# Patient Record
Sex: FEMALE | Race: WHITE | NOT HISPANIC OR LATINO | Employment: OTHER | URBAN - METROPOLITAN AREA
[De-identification: names, ages, dates, MRNs, and addresses within clinical notes are randomized per-mention and may not be internally consistent; named-entity substitution may affect disease eponyms.]

---

## 2024-01-23 PROBLEM — C34.92 PRIMARY ADENOCARCINOMA OF LEFT LUNG (MULTI): Status: ACTIVE | Noted: 2024-01-23

## 2024-01-26 ENCOUNTER — HOSPITAL ENCOUNTER (OUTPATIENT)
Dept: RADIATION ONCOLOGY | Facility: CLINIC | Age: 89
Setting detail: RADIATION/ONCOLOGY SERIES
Discharge: HOME | End: 2024-01-26
Payer: MEDICARE

## 2024-01-26 VITALS
WEIGHT: 152.2 LBS | RESPIRATION RATE: 18 BRPM | DIASTOLIC BLOOD PRESSURE: 78 MMHG | TEMPERATURE: 98.1 F | BODY MASS INDEX: 25.99 KG/M2 | HEIGHT: 64 IN | SYSTOLIC BLOOD PRESSURE: 149 MMHG | HEART RATE: 61 BPM | OXYGEN SATURATION: 93 %

## 2024-01-26 DIAGNOSIS — C34.92 PRIMARY ADENOCARCINOMA OF LEFT LUNG (MULTI): Primary | ICD-10-CM

## 2024-01-26 PROCEDURE — 99215 OFFICE O/P EST HI 40 MIN: CPT | Performed by: STUDENT IN AN ORGANIZED HEALTH CARE EDUCATION/TRAINING PROGRAM

## 2024-01-26 PROCEDURE — 99205 OFFICE O/P NEW HI 60 MIN: CPT | Performed by: STUDENT IN AN ORGANIZED HEALTH CARE EDUCATION/TRAINING PROGRAM

## 2024-01-26 RX ORDER — LOSARTAN POTASSIUM 50 MG/1
100 TABLET ORAL DAILY
COMMUNITY

## 2024-01-26 RX ORDER — GLUCOSAM/CHONDRO/HERB 149/HYAL 750-100 MG
1 TABLET ORAL DAILY
COMMUNITY

## 2024-01-26 RX ORDER — BISOPROLOL FUMARATE AND HYDROCHLOROTHIAZIDE 2.5; 6.25 MG/1; MG/1
1 TABLET ORAL DAILY
COMMUNITY

## 2024-01-26 RX ORDER — CALCIUM CARBONATE/VITAMIN D3 250-3.125
1 TABLET ORAL
COMMUNITY

## 2024-01-26 RX ORDER — PROPRANOLOL HYDROCHLORIDE 20 MG/1
20 TABLET ORAL 3 TIMES DAILY
COMMUNITY

## 2024-01-26 RX ORDER — PANTOPRAZOLE SODIUM 40 MG/1
40 TABLET, DELAYED RELEASE ORAL
COMMUNITY

## 2024-01-26 RX ORDER — HYDROCHLOROTHIAZIDE 25 MG/1
25 TABLET ORAL DAILY
COMMUNITY

## 2024-01-26 ASSESSMENT — COLUMBIA-SUICIDE SEVERITY RATING SCALE - C-SSRS
1. IN THE PAST MONTH, HAVE YOU WISHED YOU WERE DEAD OR WISHED YOU COULD GO TO SLEEP AND NOT WAKE UP?: NO
2. HAVE YOU ACTUALLY HAD ANY THOUGHTS OF KILLING YOURSELF?: NO
6. HAVE YOU EVER DONE ANYTHING, STARTED TO DO ANYTHING, OR PREPARED TO DO ANYTHING TO END YOUR LIFE?: NO

## 2024-01-26 ASSESSMENT — ENCOUNTER SYMPTOMS
DEPRESSION: 0
ENDOCRINE NEGATIVE: 1
GASTROINTESTINAL NEGATIVE: 1
CONSTITUTIONAL NEGATIVE: 1
MUSCULOSKELETAL NEGATIVE: 1
EYES NEGATIVE: 1
PSYCHIATRIC NEGATIVE: 1
NEUROLOGICAL NEGATIVE: 1
SHORTNESS OF BREATH: 1
HEMATOLOGIC/LYMPHATIC NEGATIVE: 1
COUGH: 1
OCCASIONAL FEELINGS OF UNSTEADINESS: 0
CARDIOVASCULAR NEGATIVE: 1
LOSS OF SENSATION IN FEET: 0

## 2024-01-26 ASSESSMENT — PATIENT HEALTH QUESTIONNAIRE - PHQ9
2. FEELING DOWN, DEPRESSED OR HOPELESS: NOT AT ALL
SUM OF ALL RESPONSES TO PHQ9 QUESTIONS 1 AND 2: 0
1. LITTLE INTEREST OR PLEASURE IN DOING THINGS: NOT AT ALL

## 2024-01-26 ASSESSMENT — PAIN SCALES - GENERAL: PAINLEVEL: 0-NO PAIN

## 2024-01-26 NOTE — PROGRESS NOTES
Radiation Oncology Outpatient Consult    Patient Name:  Roro Pulido  MRN:  [id number]  :  12/10/1934    Referring Provider: Clifford Alberts MD  Primary Care Provider: No primary care provider on file.  Care Team: No care team member to display    Date of Service: 2024     SUBJECTIVE  History of Present Illness:  Roro Pulido is a 89 y.o. 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 Clifford Alberts for evaluation and management.  She had a screening CT of the chest in  which incidentally noted a left upper lobe lung nodule.  Follow-up CTs of the chest in 2023 and again in 2023 showed interval increase of the left upper lobe nodule.  Her CT of the chest on 2023 measured the nodule at 1.1 x 1.1 cm.  This was followed up with a PET/CT on 11/15/2023 which showed a hypermetabolic left upper lobe lung nodule, on my review the nodule measured just over 2 cm on the low-dose CT..  There was an additional 9 mm nodular density in the posterior left lower lobe which had a very low SUV of 1.9.  No evidence of regional or distant hypermetabolic disease. Biopsy of the left upper lung showed invasive adenocarcinoma.  She endorses dyspnea on exertion however can complete light household activities without shortness of breath.  She uses albuterol as needed, and does not require other prescription inhalers or home oxygen. She is scheduled for pulmonary function test in late February at [facility]. She denies cough or chest pain.  She is a previous smoker, smoking less than a pack a day over approximately 40 years.    Prior Radiotherapy:  No    Current Systemic Treatment:  No     Presence of Pacemaker or ICD:  No    Past Medical History:    Past Medical History:   Diagnosis Date    GERD (gastroesophageal reflux disease)     HTN (hypertension)     Lung cancer (CMS/HCC)     Vitamin D deficiency         Past Surgical History:  History reviewed. No pertinent  surgical history.     Family History:  Cancer-related family history includes Breast cancer in her sister; Lung cancer in her sister.    Social History:    Social History     Tobacco Use    Smoking status: Former     Packs/day: 0.50     Years: 20.00     Additional pack years: 0.00     Total pack years: 10.00     Types: Cigarettes     Quit date:      Years since quittin.0    Smokeless tobacco: Never   Substance Use Topics    Alcohol use: Yes     Alcohol/week: 2.0 standard drinks of alcohol     Types: 2 Glasses of wine per week    Drug use: Never       Allergies:    Allergies   Allergen Reactions    Aspirin Swelling    Codeine Swelling        Medications:    Current Outpatient Medications:     bisoproloL-hydrochlorothiazide (Ziac) 2.5-6.25 mg tablet, Take 1 tablet by mouth once daily., Disp: , Rfl:     calcium carbonate-vitamin D3 (Oyster Shell) 250 mg-3.125 mcg (125 unit) tablet, Take 1 tablet by mouth 2 times a day with meals., Disp: , Rfl:     hydroCHLOROthiazide (HYDRODiuril) 25 mg tablet, Take 1 tablet (25 mg) by mouth once daily., Disp: , Rfl:     losartan (Cozaar) 50 mg tablet, Take 2 tablets (100 mg) by mouth once daily., Disp: , Rfl:     omega 3-dha-epa-fish oil (Fish OiL) 1,000 mg (120 mg-180 mg) capsule, Take 1 capsule (1,000 mg) by mouth once daily., Disp: , Rfl:     pantoprazole (ProtoNix) 40 mg EC tablet, Take 1 tablet (40 mg) by mouth once daily in the morning. Take before meals. Do not crush, chew, or split., Disp: , Rfl:     propranolol (Inderal) 20 mg tablet, Take 1 tablet (20 mg) by mouth 3 times a day., Disp: , Rfl:       Review of Systems:  Review of Systems   Constitutional: Negative.    HENT:  Negative.     Eyes: Negative.    Respiratory:  Positive for cough and shortness of breath.         Sinus infection     Cardiovascular: Negative.    Gastrointestinal: Negative.    Endocrine: Negative.    Genitourinary: Negative.     Musculoskeletal: Negative.    Skin: Negative.    Neurological:  "Negative.    Hematological: Negative.    Psychiatric/Behavioral: Negative.       The patient's current pain level was assessed.  They report currently having a pain of 0 out of 10.  They feel their pain is under control without the use of pain medications.    Performance Status:  The Karnofsky performance scale today is 80, Normal activity with effort; some signs or symptoms of disease (ECOG equivalent 1).        OBJECTIVE  Physical Exam:  /78   Pulse 61   Temp 36.7 °C (98.1 °F)   Resp 18   Ht 1.626 m (5' 4\")   Wt 69 kg (152 lb 3.2 oz)   SpO2 93%   BMI 26.13 kg/m²    Physical Exam  Vitals reviewed.   Constitutional:       General: She is not in acute distress.  HENT:      Head: Normocephalic and atraumatic.   Cardiovascular:      Rate and Rhythm: Normal rate and regular rhythm.      Heart sounds: No murmur heard.  Pulmonary:      Effort: Pulmonary effort is normal. No respiratory distress.      Breath sounds: No wheezing.   Skin:     Findings: No erythema or rash.   Neurological:      Mental Status: She is alert and oriented to person, place, and time.   Psychiatric:         Mood and Affect: Mood normal.         Behavior: Behavior normal.        Pathology Review:  The pertinent pathology results were reviewed and discussed with the patient.     Imaging:  The pertinent imaging results were reviewed and discussed with the patient.         ASSESSMENT:  Roro Pulido is a 89 y.o. female with Primary adenocarcinoma of left lung (CMS/HCC), Clinical: Stage IA3 (cT1c, cN0, cM0).    PLAN:    We reviewed the management options for early stage lung malignancy including surgery versus stereotactic radiotherapy. She has a small peripheral primary malignancy which is amenable to SBRT in 3 fractions.   I believe this is a much more feasible option for her than surgery considering her age. We discussed the logistics of radiation planning including CT simulation.  Acute side effects of treatment include but are not " limited to fatigue, focal skin reaction, cough, shortness of breath, pneumonitis, chest wall pain.  Long-term risks of treatment include but are not limited to fibrosis of the lung, chest wall toxicity, damage to other organs of the thorax including the heart, esophagus, spinal cord, rare risk of secondary malignancy.   She stated her understanding and wishes to proceed.  Informed consent was signed.  CT simulation will be scheduled.    NCCN Guidelines were applicable to guide this patients treatment plan.     Thank you for allowing me to participate in this patient's care.    Ryan Newton MD  Department of Radiation Oncology  [facility]    Portions of this note were generated using voice recognition software, and may be subject to transcription errors.

## 2024-01-31 ENCOUNTER — HOSPITAL ENCOUNTER (OUTPATIENT)
Dept: RADIATION ONCOLOGY | Facility: HOSPITAL | Age: 89
Setting detail: RADIATION/ONCOLOGY SERIES
Discharge: HOME | End: 2024-01-31
Payer: MEDICARE

## 2024-01-31 ENCOUNTER — HOSPITAL ENCOUNTER (OUTPATIENT)
Dept: RADIOLOGY | Facility: EXTERNAL LOCATION | Age: 89
Discharge: HOME | End: 2024-01-31

## 2024-01-31 DIAGNOSIS — C34.92 PRIMARY ADENOCARCINOMA OF LEFT LUNG (MULTI): Primary | ICD-10-CM

## 2024-01-31 DIAGNOSIS — C34.92 PRIMARY ADENOCARCINOMA OF LEFT LUNG (MULTI): ICD-10-CM

## 2024-01-31 PROCEDURE — 77334 RADIATION TREATMENT AID(S): CPT | Performed by: RADIOLOGY

## 2024-01-31 PROCEDURE — 77290 THER RAD SIMULAJ FIELD CPLX: CPT | Performed by: RADIOLOGY

## 2024-02-01 ENCOUNTER — APPOINTMENT (OUTPATIENT)
Dept: RADIATION ONCOLOGY | Facility: HOSPITAL | Age: 89
End: 2024-02-01
Payer: MEDICARE

## 2024-02-01 PROCEDURE — 77263 THER RADIOLOGY TX PLNG CPLX: CPT | Performed by: STUDENT IN AN ORGANIZED HEALTH CARE EDUCATION/TRAINING PROGRAM

## 2024-02-06 ENCOUNTER — HOSPITAL ENCOUNTER (OUTPATIENT)
Dept: RADIATION ONCOLOGY | Facility: CLINIC | Age: 89
Setting detail: RADIATION/ONCOLOGY SERIES
Discharge: HOME | End: 2024-02-06
Payer: MEDICARE

## 2024-02-07 PROCEDURE — 77370 RADIATION PHYSICS CONSULT: CPT | Performed by: STUDENT IN AN ORGANIZED HEALTH CARE EDUCATION/TRAINING PROGRAM

## 2024-02-08 ENCOUNTER — HOSPITAL ENCOUNTER (OUTPATIENT)
Dept: RADIATION ONCOLOGY | Facility: CLINIC | Age: 89
Setting detail: RADIATION/ONCOLOGY SERIES
Discharge: HOME | End: 2024-02-08
Payer: MEDICARE

## 2024-02-08 ENCOUNTER — APPOINTMENT (OUTPATIENT)
Dept: RADIATION ONCOLOGY | Facility: HOSPITAL | Age: 89
End: 2024-02-08
Payer: MEDICARE

## 2024-02-08 PROCEDURE — 77293 RESPIRATOR MOTION MGMT SIMUL: CPT | Performed by: STUDENT IN AN ORGANIZED HEALTH CARE EDUCATION/TRAINING PROGRAM

## 2024-02-08 PROCEDURE — 77295 3-D RADIOTHERAPY PLAN: CPT | Performed by: STUDENT IN AN ORGANIZED HEALTH CARE EDUCATION/TRAINING PROGRAM

## 2024-02-08 PROCEDURE — 77334 RADIATION TREATMENT AID(S): CPT | Performed by: STUDENT IN AN ORGANIZED HEALTH CARE EDUCATION/TRAINING PROGRAM

## 2024-02-08 PROCEDURE — 77300 RADIATION THERAPY DOSE PLAN: CPT | Performed by: STUDENT IN AN ORGANIZED HEALTH CARE EDUCATION/TRAINING PROGRAM

## 2024-02-14 ENCOUNTER — HOSPITAL ENCOUNTER (OUTPATIENT)
Dept: RADIATION ONCOLOGY | Facility: CLINIC | Age: 89
Setting detail: RADIATION/ONCOLOGY SERIES
Discharge: HOME | End: 2024-02-14
Payer: MEDICARE

## 2024-02-14 DIAGNOSIS — C34.12 MALIGNANT NEOPLASM OF UPPER LOBE, LEFT BRONCHUS OR LUNG (MULTI): ICD-10-CM

## 2024-02-14 LAB
RAD ONC MSQ ACTUAL FRACTIONS DELIVERED: 1
RAD ONC MSQ ACTUAL SESSION DELIVERED DOSE: 1800 CGRAY
RAD ONC MSQ ACTUAL TOTAL DOSE: 1800 CGRAY
RAD ONC MSQ ELAPSED DAYS: 0
RAD ONC MSQ LAST DATE: NORMAL
RAD ONC MSQ PRESCRIBED FRACTIONAL DOSE: 1800 CGRAY
RAD ONC MSQ PRESCRIBED NUMBER OF FRACTIONS: 3
RAD ONC MSQ PRESCRIBED TECHNIQUE: NORMAL
RAD ONC MSQ PRESCRIBED TOTAL DOSE: 5400 CGRAY
RAD ONC MSQ PRESCRIPTION PATTERN COMMENT: NORMAL
RAD ONC MSQ START DATE: NORMAL
RAD ONC MSQ TREATMENT COURSE NUMBER: 1
RAD ONC MSQ TREATMENT SITE: NORMAL

## 2024-02-14 PROCEDURE — 77280 THER RAD SIMULAJ FIELD SMPL: CPT | Performed by: STUDENT IN AN ORGANIZED HEALTH CARE EDUCATION/TRAINING PROGRAM

## 2024-02-14 PROCEDURE — 77373 STRTCTC BDY RAD THER TX DLVR: CPT | Performed by: STUDENT IN AN ORGANIZED HEALTH CARE EDUCATION/TRAINING PROGRAM

## 2024-02-16 ENCOUNTER — HOSPITAL ENCOUNTER (OUTPATIENT)
Dept: RADIATION ONCOLOGY | Facility: CLINIC | Age: 89
Setting detail: RADIATION/ONCOLOGY SERIES
Discharge: HOME | End: 2024-02-16
Payer: MEDICARE

## 2024-02-16 DIAGNOSIS — C34.12 MALIGNANT NEOPLASM OF UPPER LOBE, LEFT BRONCHUS OR LUNG (MULTI): ICD-10-CM

## 2024-02-16 LAB
RAD ONC MSQ ACTUAL FRACTIONS DELIVERED: 2
RAD ONC MSQ ACTUAL SESSION DELIVERED DOSE: 1800 CGRAY
RAD ONC MSQ ACTUAL TOTAL DOSE: 3600 CGRAY
RAD ONC MSQ ELAPSED DAYS: 2
RAD ONC MSQ LAST DATE: NORMAL
RAD ONC MSQ PRESCRIBED FRACTIONAL DOSE: 1800 CGRAY
RAD ONC MSQ PRESCRIBED NUMBER OF FRACTIONS: 3
RAD ONC MSQ PRESCRIBED TECHNIQUE: NORMAL
RAD ONC MSQ PRESCRIBED TOTAL DOSE: 5400 CGRAY
RAD ONC MSQ PRESCRIPTION PATTERN COMMENT: NORMAL
RAD ONC MSQ START DATE: NORMAL
RAD ONC MSQ TREATMENT COURSE NUMBER: 1
RAD ONC MSQ TREATMENT SITE: NORMAL

## 2024-02-16 PROCEDURE — 77373 STRTCTC BDY RAD THER TX DLVR: CPT | Performed by: STUDENT IN AN ORGANIZED HEALTH CARE EDUCATION/TRAINING PROGRAM

## 2024-02-19 ENCOUNTER — RADIATION ONCOLOGY OTV (OUTPATIENT)
Dept: RADIATION ONCOLOGY | Facility: CLINIC | Age: 89
End: 2024-02-19
Payer: MEDICARE

## 2024-02-19 ENCOUNTER — HOSPITAL ENCOUNTER (OUTPATIENT)
Dept: RADIATION ONCOLOGY | Facility: CLINIC | Age: 89
Setting detail: RADIATION/ONCOLOGY SERIES
Discharge: HOME | End: 2024-02-19
Payer: MEDICARE

## 2024-02-19 ENCOUNTER — DOCUMENTATION (OUTPATIENT)
Dept: RADIATION ONCOLOGY | Facility: CLINIC | Age: 89
End: 2024-02-19
Payer: MEDICARE

## 2024-02-19 VITALS
SYSTOLIC BLOOD PRESSURE: 194 MMHG | TEMPERATURE: 97.9 F | HEART RATE: 63 BPM | WEIGHT: 151.4 LBS | OXYGEN SATURATION: 94 % | BODY MASS INDEX: 25.99 KG/M2 | DIASTOLIC BLOOD PRESSURE: 81 MMHG | RESPIRATION RATE: 18 BRPM

## 2024-02-19 DIAGNOSIS — C34.12 MALIGNANT NEOPLASM OF UPPER LOBE, LEFT BRONCHUS OR LUNG (MULTI): ICD-10-CM

## 2024-02-19 DIAGNOSIS — C34.92 PRIMARY ADENOCARCINOMA OF LEFT LUNG (MULTI): Primary | ICD-10-CM

## 2024-02-19 LAB
RAD ONC MSQ ACTUAL FRACTIONS DELIVERED: 3
RAD ONC MSQ ACTUAL SESSION DELIVERED DOSE: 1800 CGRAY
RAD ONC MSQ ACTUAL TOTAL DOSE: 5400 CGRAY
RAD ONC MSQ ELAPSED DAYS: 5
RAD ONC MSQ LAST DATE: NORMAL
RAD ONC MSQ PRESCRIBED FRACTIONAL DOSE: 1800 CGRAY
RAD ONC MSQ PRESCRIBED NUMBER OF FRACTIONS: 3
RAD ONC MSQ PRESCRIBED TECHNIQUE: NORMAL
RAD ONC MSQ PRESCRIBED TOTAL DOSE: 5400 CGRAY
RAD ONC MSQ PRESCRIPTION PATTERN COMMENT: NORMAL
RAD ONC MSQ START DATE: NORMAL
RAD ONC MSQ TREATMENT COURSE NUMBER: 1
RAD ONC MSQ TREATMENT SITE: NORMAL

## 2024-02-19 PROCEDURE — 77373 STRTCTC BDY RAD THER TX DLVR: CPT | Performed by: STUDENT IN AN ORGANIZED HEALTH CARE EDUCATION/TRAINING PROGRAM

## 2024-02-19 PROCEDURE — 77435 SBRT MANAGEMENT: CPT | Performed by: STUDENT IN AN ORGANIZED HEALTH CARE EDUCATION/TRAINING PROGRAM

## 2024-02-19 PROCEDURE — 77336 RADIATION PHYSICS CONSULT: CPT | Performed by: STUDENT IN AN ORGANIZED HEALTH CARE EDUCATION/TRAINING PROGRAM

## 2024-02-19 ASSESSMENT — PAIN SCALES - GENERAL: PAINLEVEL: 0-NO PAIN

## 2024-02-19 NOTE — PROGRESS NOTES
Radiation Oncology On Treatment Visit    Patient Name:  Roro Pulido  MRN:  [id number]  :  12/10/1934    Referring Provider: No ref. provider found  Primary Care Provider: No primary care provider on file.  Care Team: No care team member to display    Date of Service: 2024     Diagnosis:   Specialty Problems          Radiation Oncology Problems    Primary adenocarcinoma of left lung (CMS/HCC)         Treatment Summary:  Radiation Treatments       Active   No active radiation treatments to show.     Completed   BUCK lung (Started on 2024)   Most recent fraction: 1,800 cGy given on 2024   Total given: 5,400 cGy / 5,400 cGy  (3 of 3 fractions)   Elapsed Days: 5   Technique: SBRT                   SUBJECTIVE: Tolerated treatment course well without complaints.  She has mild dyspnea on exertion at baseline.  Denies  worsening shortness of breath or cough.      OBJECTIVE:   Vital Signs:  BP (!) 194/81   Pulse 63   Temp 36.6 °C (97.9 °F)   Resp 18   Wt 68.7 kg (151 lb 6.4 oz)   SpO2 94%   BMI 25.99 kg/m²    Pain Scale: The patient's current pain level was assessed.  They report currently having a pain of 0 out of 10.    Other Pertinent Findings:     Toxicity Assessment          2024    08:52   Toxicity Assessment   Treatment Site Thoracic   Cough Grade 1       mild occ cough that is dry   Dyspnea Grade 1       SOB with exertion        Assessment / Plan:  The patient is tolerating radiation therapy as anticipated.  End of tx today. Follow-up CT chest w/o in 3 months, follow-up appt at that time.        Thank you for allowing me to participate in this patient's care.    Ryan Newton MD  Department of Radiation Oncology  [facility]    Portions of this note were generated using voice recognition software, and may be subject to transcription errors.

## 2024-02-19 NOTE — PROGRESS NOTES
Radiation Oncology Treatment Summary    Patient Name:  Roro Pulido  MRN:  [id number]  :  12/10/1934    Referring Provider: Clifford Alberts MD  Primary Care Provider: No primary care provider on file.    Brief History: oRro Pulido is a 89 y.o. female with Primary adenocarcinoma of left lung (CMS/HCC), Clinical: Stage IA3 (cT1c, cN0, cM0).  The patient completed radiotherapy as outlined below.    Radiation Treatment Summary:    Radiation Treatments       Active   No active radiation treatments to show.     Completed   BUCK lung (Started on 2024)   Most recent fraction: 1,800 cGy given on 2024   Total given: 5,400 cGy / 5,400 cGy  (3 of 3 fractions)   Elapsed Days: 5   Technique: SBRT                     Please see the patient's [facility] chart for further details regarding the radiation plan, including beam energy.    Concurrent Chemotherapy: none    CTCAE Toxicity Overview:   Toxicity Assessment          2024    08:52   Toxicity Assessment   Treatment Site Thoracic   Cough Grade 1       mild occ cough that is dry   Dyspnea Grade 1       SOB with exertion     Patient Disposition:   The patient will follow-up in 3 months with a surveillance CT chest without contrast.    Thank you for allowing me to participate in this patient's care.    Ryan Newton MD  Department of Radiation Oncology  [facility]    Portions of this note were generated using voice recognition software, and may be subject to transcription errors.

## 2024-05-20 ENCOUNTER — HOSPITAL ENCOUNTER (OUTPATIENT)
Dept: RADIOLOGY | Facility: CLINIC | Age: 89
Discharge: HOME | End: 2024-05-20
Payer: MEDICARE

## 2024-05-20 DIAGNOSIS — C34.92 PRIMARY ADENOCARCINOMA OF LEFT LUNG (MULTI): ICD-10-CM

## 2024-05-20 PROCEDURE — 71250 CT THORAX DX C-: CPT | Performed by: RADIOLOGY

## 2024-05-20 PROCEDURE — 71250 CT THORAX DX C-: CPT

## 2024-05-23 ENCOUNTER — HOSPITAL ENCOUNTER (OUTPATIENT)
Dept: RADIATION ONCOLOGY | Facility: CLINIC | Age: 89
Setting detail: RADIATION/ONCOLOGY SERIES
Discharge: HOME | End: 2024-05-23
Payer: MEDICARE

## 2024-05-23 VITALS
SYSTOLIC BLOOD PRESSURE: 144 MMHG | TEMPERATURE: 98.1 F | OXYGEN SATURATION: 94 % | BODY MASS INDEX: 26.23 KG/M2 | DIASTOLIC BLOOD PRESSURE: 67 MMHG | RESPIRATION RATE: 18 BRPM | HEART RATE: 72 BPM | WEIGHT: 152.8 LBS

## 2024-05-23 DIAGNOSIS — R91.1 PULMONARY NODULE: ICD-10-CM

## 2024-05-23 DIAGNOSIS — C34.92 PRIMARY ADENOCARCINOMA OF LEFT LUNG (MULTI): Primary | ICD-10-CM

## 2024-05-23 PROCEDURE — 99214 OFFICE O/P EST MOD 30 MIN: CPT | Performed by: STUDENT IN AN ORGANIZED HEALTH CARE EDUCATION/TRAINING PROGRAM

## 2024-05-23 ASSESSMENT — PAIN SCALES - GENERAL: PAINLEVEL: 0-NO PAIN

## 2024-05-23 ASSESSMENT — ENCOUNTER SYMPTOMS
GASTROINTESTINAL NEGATIVE: 1
EYES NEGATIVE: 1
CARDIOVASCULAR NEGATIVE: 1
ARTHRALGIAS: 1
NEUROLOGICAL NEGATIVE: 1
PSYCHIATRIC NEGATIVE: 1
CONSTITUTIONAL NEGATIVE: 1
SHORTNESS OF BREATH: 1
HEMATOLOGIC/LYMPHATIC NEGATIVE: 1
ENDOCRINE NEGATIVE: 1

## 2024-05-23 NOTE — PROGRESS NOTES
Radiation Oncology Follow-Up    Patient Name:  Roro Pulido  MRN:  [id number]  :  12/10/1934    Referring Provider: No ref. provider found  Primary Care Provider: ZAFAR Peace  Care Team: Patient Care Team:  ZAFAR Peace as PCP - General (Family Medicine)    Date of Service: 2024     SUBJECTIVE  History of Present Illness:   Roro Pulido is a 89 y.o. female with adenocarcinoma of the left upper lobe lung, [id number] Stage 1A3. S/p SBRT 54 Gy in 3 fx completed 2024. Rturning today for follow-up. CT chest without contrast on  24 showed interval decrease in size of treated left upper lobe lung nodule with surrounding opacities and trace pleural effusion or thickening reflecting posttreatment changes versus nonspecific inflammation/infection.  There is a rounded left lower lobe pulmonary nodule measuring 13 mm, which appears slightly increased in size from 9mm previously.  This nodule was present prior to her left upper lobe SBRT course, and did not show activity on prior PET/CT.  She states she is overall well with no new or worsening shortness of breath since her radiotherapy course.  She recently saw her pulmonology team.  She denies new or worsening cough or chest pain.  She endorses good appetite.    Treatment Rendered:   SBRT: Left Upper lobe of lung    Treatment Period Technique Fraction Dose Fractions Total Dose   Course 1 2024-2024  (days elapsed: 5)         BUCK lung 2024-2024 SBRT 1800 / 1,800 cGy 3 / 3 5400 / 5,400 cGy       Review of Systems:   Review of Systems - Oncology  - please see nursing note    Performance Status:   The Karnofsky performance scale today is 80, Normal activity with effort; some signs or symptoms of disease (ECOG equivalent 1).      OBJECTIVE  /67   Pulse 72   Temp 36.7 °C (98.1 °F)   Resp 18   Wt 69.3 kg (152 lb 12.8 oz)   SpO2 94%   BMI 26.23 kg/m²    Physical Exam  Vitals reviewed.   Constitutional:        General: She is not in acute distress.  HENT:      Head: Normocephalic and atraumatic.   Cardiovascular:      Rate and Rhythm: Normal rate and regular rhythm.      Heart sounds: No murmur heard.  Pulmonary:      Effort: Pulmonary effort is normal. No respiratory distress.      Breath sounds: No wheezing.   Abdominal:      General: There is no distension.   Skin:     Findings: No erythema or rash.   Neurological:      Mental Status: She is alert and oriented to person, place, and time.   Psychiatric:         Mood and Affect: Mood normal.         Behavior: Behavior normal.           ASSESSMENT:  Roro Pulido is a 89 y.o. female with adenocarcinoma of the left upper lobe lung, [id number] Stage 1A3. S/p SBRT 54 Gy in 3 fx completed 2/2024.  Posttreatment CT showing local control of treated left upper lobe malignancy with evidence of surrounding inflammation/scarring.  She is asymptomatic from this.  There has been slight interval growth of a rounded left lower lobe pulmonary nodule, which did not show PET activity on on her prior PET from 2023.     PLAN:   Will obtain PET/CT now to evaluate enlarging left lower lobe nodule, and to assess for any potential new areas of activity.  If the nodule shows increased activity, I would recommend a biopsy of the nodule.  I will call the patient to discuss PET results once they are available.    NCCN Guidelines were applicable to guide this patients treatment plan.    Thank you for allowing me to participate in this patient's care.    Ryan Newton MD  Department of Radiation Oncology  [facility]    Portions of this note were generated using voice recognition software, and may be subject to transcription errors.

## 2024-05-23 NOTE — PROGRESS NOTES
Radiation Oncology Nursing Note    Pain: The patient's current pain level was assessed.  They report currently having a pain of 0 out of 10.  They feel their pain is under control without the use of pain medications.    Review of Systems:  Review of Systems   Constitutional: Negative.    HENT:  Negative.     Eyes: Negative.    Respiratory:  Positive for shortness of breath.    Cardiovascular: Negative.    Gastrointestinal: Negative.    Endocrine: Negative.    Genitourinary: Negative.     Musculoskeletal:  Positive for arthralgias.   Skin: Negative.    Neurological: Negative.    Hematological: Negative.    Psychiatric/Behavioral: Negative.

## 2024-06-13 ENCOUNTER — HOSPITAL ENCOUNTER (OUTPATIENT)
Dept: RADIOLOGY | Facility: CLINIC | Age: 89
Discharge: HOME | End: 2024-06-13
Payer: MEDICARE

## 2024-06-13 DIAGNOSIS — C34.92 PRIMARY ADENOCARCINOMA OF LEFT LUNG (MULTI): ICD-10-CM

## 2024-06-13 PROCEDURE — 78815 PET IMAGE W/CT SKULL-THIGH: CPT | Mod: PS

## 2024-06-13 PROCEDURE — 3430000001 HC RX 343 DIAGNOSTIC RADIOPHARMACEUTICALS: Performed by: STUDENT IN AN ORGANIZED HEALTH CARE EDUCATION/TRAINING PROGRAM

## 2024-06-13 PROCEDURE — A9552 F18 FDG: HCPCS | Performed by: STUDENT IN AN ORGANIZED HEALTH CARE EDUCATION/TRAINING PROGRAM

## 2024-06-13 RX ORDER — FLUDEOXYGLUCOSE F 18 200 MCI/ML
12.1 INJECTION, SOLUTION INTRAVENOUS
Status: COMPLETED | OUTPATIENT
Start: 2024-06-13 | End: 2024-06-13

## 2024-06-19 ENCOUNTER — HOSPITAL ENCOUNTER (OUTPATIENT)
Dept: RADIATION ONCOLOGY | Facility: CLINIC | Age: 89
Setting detail: RADIATION/ONCOLOGY SERIES
Discharge: HOME | End: 2024-06-19
Payer: MEDICARE

## 2024-06-19 DIAGNOSIS — N85.8 ABNORMAL COLLECTION OF FLUID IN UTERINE CAVITY: ICD-10-CM

## 2024-06-19 DIAGNOSIS — C34.92 PRIMARY ADENOCARCINOMA OF LEFT LUNG (MULTI): Primary | ICD-10-CM

## 2024-06-19 PROCEDURE — 99442 PR PHYS/QHP TELEPHONE EVALUATION 11-20 MIN: CPT | Performed by: STUDENT IN AN ORGANIZED HEALTH CARE EDUCATION/TRAINING PROGRAM

## 2024-06-19 ASSESSMENT — ENCOUNTER SYMPTOMS
GASTROINTESTINAL NEGATIVE: 1
NEUROLOGICAL NEGATIVE: 1
ENDOCRINE NEGATIVE: 1
CARDIOVASCULAR NEGATIVE: 1
CONSTITUTIONAL NEGATIVE: 1
COUGH: 1
HEMATOLOGIC/LYMPHATIC NEGATIVE: 1
SHORTNESS OF BREATH: 1
MUSCULOSKELETAL NEGATIVE: 1
EYES NEGATIVE: 1
PSYCHIATRIC NEGATIVE: 1

## 2024-06-19 ASSESSMENT — PAIN SCALES - GENERAL: PAINLEVEL: 0-NO PAIN

## 2024-06-19 NOTE — PROGRESS NOTES
Radiation Oncology Nursing Note    Pain: The patient's current pain level was assessed.  They report currently having a pain of 0 out of 10.  They feel their pain is under control without the use of pain medications.    Review of Systems:  Review of Systems   Constitutional: Negative.    HENT:  Negative.     Eyes: Negative.    Respiratory:  Positive for cough and shortness of breath.    Cardiovascular: Negative.    Gastrointestinal: Negative.    Endocrine: Negative.    Genitourinary: Negative.     Musculoskeletal: Negative.    Skin: Negative.    Neurological: Negative.    Hematological: Negative.    Psychiatric/Behavioral: Negative.

## 2024-06-19 NOTE — PROGRESS NOTES
Radiation Oncology Follow-Up    Patient Name:  Roro Pulido  MRN:  [id number]  :  12/10/1934    Referring Provider: No ref. provider found  Primary Care Provider: ZAFAR Peace  Care Team: Patient Care Team:  ZAFAR Peace as PCP - General (Family Medicine)    Date of Service: 2024     SUBJECTIVE  History of Present Illness:   Roro Pulido is a 89 y.o. female with adenocarcinoma of the left upper lobe lung, [id number] Stage 1A3. S/p SBRT 54 Gy in 3 fx completed 2024.    Subsequent CT of the chest showed interval enlargement of a left lower lobe pulmonary nodule.  She was recommended PET/CT for further evaluation.  PET/CT on 2024 showed interval worsening of consolidative opacities in the in the left upper lobe with a bandlike opacity of the lingula with an SUV max of 22, consolidative opacity in the superior left upper lobe with SUV max of 6.4, and a 1.3 cm left lower lobe nodule showing SUV max of 4.0.  There was a mildly hypermetabolic left prevascular node with SUV max of 3.4.  There was also mild activity noted within a fluid-filled uterus, ultrasound was recommended for further evaluation.   She endorses continued slight cough and shortness of breath which has been stable in recent months.  She denies pelvic pain, uterine discharge, or uterine bleeding.  She does not have a history of uterine fibroids or cancer.         Treatment Rendered:   SBRT: Left Upper lobe of lung    Treatment Period Technique Fraction Dose Fractions Total Dose   Course 1 2024-2024  (days elapsed: 5)         BUCK lung 2024-2024 SBRT 1800 / 1,800 cGy 3 / 3 5400 / 5,400 cGy       Review of Systems:   Review of Systems - Oncology  - please see nursing note    Performance Status:   The Karnofsky performance scale today is 80, Normal activity with effort; some signs or symptoms of disease (ECOG equivalent 1).      OBJECTIVE  Physical Exam - NA phone visit     ASSESSMENT:  Roro  Ashok is a 89 y.o. female with  adenocarcinoma of the left upper lobe lung, [id number] Stage 1A3. S/p SBRT 54 Gy in 3 fx completed 2/2024.   Subsequent CT of the chest was concerning for interval enlargement of the left lower lobe pulmonary nodule, prompting PET/CT.  PET/CT showed left upper lobe opacities around the treated left upper lobe malignancy, which on my review is most consistent with inflammation and fibrosis.  There is however a new bandlike opacity in the lingula with significant avidity, potentially concerning for malignancy.  There is also mild uptake in an enlarging left lower lobe pulmonary nodule which is concerning.  There is also mention of a mildly avid prevascular lymph node, and mild avidity within the uterus with a fluid-filled endometrium.    PLAN:   Concern for progressive malignancy in the left lingula and left lower lobe of the lung, and possibly mediastinum.  Will review findings  at lung tumor conference to discuss workup and treatment options.  I also discussed the uterine finding on PET, and a transvaginal ultrasound was ordered to further evaluate.  I will call the patient with results after tumor board is complete.    Thank you for allowing me to participate in this patient's care.    Ryan Newton MD  Department of Radiation Oncology  [facility]    Portions of this note were generated using voice recognition software, and may be subject to transcription errors.      Verbal consent for encounter: Verbal consent was requested and obtained from the patient on this date for a telehealth visit.

## 2024-06-20 ENCOUNTER — HOSPITAL ENCOUNTER (OUTPATIENT)
Dept: RADIOLOGY | Facility: CLINIC | Age: 89
Discharge: HOME | End: 2024-06-20
Payer: MEDICARE

## 2024-06-20 DIAGNOSIS — N85.8 ABNORMAL COLLECTION OF FLUID IN UTERINE CAVITY: ICD-10-CM

## 2024-06-20 PROCEDURE — 76830 TRANSVAGINAL US NON-OB: CPT

## 2024-06-25 ENCOUNTER — TELEPHONE (OUTPATIENT)
Dept: RADIATION ONCOLOGY | Facility: CLINIC | Age: 89
End: 2024-06-25
Payer: MEDICARE

## 2024-06-25 DIAGNOSIS — R93.89 THICKENED ENDOMETRIUM: Primary | ICD-10-CM

## 2024-06-25 NOTE — TELEPHONE ENCOUNTER
Called to update patient re: TV/US results  showing endometrial thickening.  Will refer the patient to GYN oncology for further eval. I am still planning to discuss her lung imaging at the next [facility] lung tumor conference.     Thank you for allowing me to participate in this patient's care.    Ryan Newton MD  Department of Radiation Oncology  [facility]    Portions of this note were generated using voice recognition software, and may be subject to transcription errors.

## 2024-07-10 ENCOUNTER — APPOINTMENT (OUTPATIENT)
Dept: HEMATOLOGY/ONCOLOGY | Facility: HOSPITAL | Age: 89
End: 2024-07-10
Payer: MEDICARE

## 2024-07-10 ENCOUNTER — TELEPHONE (OUTPATIENT)
Dept: RADIATION ONCOLOGY | Facility: CLINIC | Age: 89
End: 2024-07-10

## 2024-07-10 DIAGNOSIS — C34.92 PRIMARY ADENOCARCINOMA OF LEFT LUNG (MULTI): Primary | ICD-10-CM

## 2024-07-10 DIAGNOSIS — J18.9 PNEUMONIA OF LEFT UPPER LOBE DUE TO INFECTIOUS ORGANISM: ICD-10-CM

## 2024-07-10 RX ORDER — LEVOFLOXACIN 750 MG/1
750 TABLET ORAL DAILY
Qty: 5 TABLET | Refills: 0 | Status: SHIPPED | OUTPATIENT
Start: 2024-07-10 | End: 2024-07-15

## 2024-07-10 NOTE — TELEPHONE ENCOUNTER
Reviewed patient at [facility] . No clear mass in the left lingula causing post-obstruction - findings more consistent with a pneumonia vs pneumonitis picture. LLL lung nodule minimally changed since January 2024. Options for management include a bronchoscopy / biopsy of the lingula to rule out obstruction from malignancy, vs continued close surveillance and empirix tx of pneumonia. I discussed options with the patient and our plan is to monitor the lungs closely with a repeat CT chest in 3 months. I will empircally send a 5-day course of abx to her pharmacy. She plans to establish with gynecology for the finding of thickened endometrium.    Thank you for allowing me to participate in this patient's care.    Ryan Newton MD  Department of Radiation Oncology  [facility]    Portions of this note were generated using voice recognition software, and may be subject to transcription errors.

## 2024-07-17 ENCOUNTER — TELEPHONE (OUTPATIENT)
Dept: RADIATION ONCOLOGY | Facility: CLINIC | Age: 89
End: 2024-07-17
Payer: MEDICARE

## 2024-07-17 NOTE — TELEPHONE ENCOUNTER
This patient started taking the Levaquin on Monday evening and proceeded to have sore legs which got hard. This had never happened to her previously and she had not done anything outside of normal that evening. She stopped taking the antibiotic after one dose and the reaction has been improving. She called asking what she should do in terms of either continuing her antibiotic or starting a new one.  She was advised to stop the antibiotic at this time and we will observe as she does not have symptoms concerning for a developing pneumonia at this time.

## 2024-08-09 ENCOUNTER — APPOINTMENT (OUTPATIENT)
Dept: PRIMARY CARE | Facility: CLINIC | Age: 89
End: 2024-08-09
Payer: MEDICARE

## 2024-08-09 ENCOUNTER — TELEPHONE (OUTPATIENT)
Dept: PRIMARY CARE | Facility: CLINIC | Age: 89
End: 2024-08-09

## 2024-08-09 VITALS
BODY MASS INDEX: 26.9 KG/M2 | HEART RATE: 62 BPM | SYSTOLIC BLOOD PRESSURE: 129 MMHG | WEIGHT: 151.8 LBS | HEIGHT: 63 IN | DIASTOLIC BLOOD PRESSURE: 65 MMHG | OXYGEN SATURATION: 95 %

## 2024-08-09 DIAGNOSIS — G25.0 ESSENTIAL TREMOR: ICD-10-CM

## 2024-08-09 DIAGNOSIS — C34.92 PRIMARY ADENOCARCINOMA OF LEFT LUNG (MULTI): ICD-10-CM

## 2024-08-09 DIAGNOSIS — I10 BENIGN ESSENTIAL HTN: ICD-10-CM

## 2024-08-09 DIAGNOSIS — K21.00 GASTROESOPHAGEAL REFLUX DISEASE WITH ESOPHAGITIS WITHOUT HEMORRHAGE: ICD-10-CM

## 2024-08-09 DIAGNOSIS — R13.19 ESOPHAGEAL DYSPHAGIA: ICD-10-CM

## 2024-08-09 DIAGNOSIS — E21.3 HYPERPARATHYROIDISM (MULTI): Primary | ICD-10-CM

## 2024-08-09 PROCEDURE — 1126F AMNT PAIN NOTED NONE PRSNT: CPT | Performed by: INTERNAL MEDICINE

## 2024-08-09 PROCEDURE — 99205 OFFICE O/P NEW HI 60 MIN: CPT | Performed by: INTERNAL MEDICINE

## 2024-08-09 PROCEDURE — 3074F SYST BP LT 130 MM HG: CPT | Performed by: INTERNAL MEDICINE

## 2024-08-09 PROCEDURE — 3078F DIAST BP <80 MM HG: CPT | Performed by: INTERNAL MEDICINE

## 2024-08-09 ASSESSMENT — ENCOUNTER SYMPTOMS
DIARRHEA: 0
CONSTIPATION: 0
COUGH: 1
HEADACHES: 0
SHORTNESS OF BREATH: 0

## 2024-08-09 ASSESSMENT — PAIN SCALES - GENERAL: PAINLEVEL: 0-NO PAIN

## 2024-08-09 ASSESSMENT — PATIENT HEALTH QUESTIONNAIRE - PHQ9
SUM OF ALL RESPONSES TO PHQ9 QUESTIONS 1 AND 2: 0
1. LITTLE INTEREST OR PLEASURE IN DOING THINGS: NOT AT ALL
2. FEELING DOWN, DEPRESSED OR HOPELESS: NOT AT ALL

## 2024-08-09 NOTE — PROGRESS NOTES
"Subjective   Patient ID: Roro Pulido is a 89 y.o. female who presents for Establish Care (Roro is here to establish care.).    HPI     She had been seeing Dr London who     She has stage 1 lung Cancer Dx Dec 2023  She has a small spot in her lower lung and also a lymph node  Was discovered by pulmonologist as she was going for some breathing problems  She sees  oncology and has had radiation therapy     She is taking albuterol that she uses as needed.   She said that her \"lungs shrunk from a UTI medicaton\"  ( scarring ) had taken med 3 x in one year     Dtr said that at another time she was given an antibiotic for pneumonia and she had severe leg cramps    She also had endometrial fluid in the uterus and has been referred to gyn onc    She has been on the same meds for 20 years and she wonders how she can tell if they are still working     She sees Dr Nair from [facility] for her hyperparathyroidism  She will see her in Dec or      She has not had any surgeries     x 2    Soc: she worked for [employer] and also [employer] as .  She worked for the [employer] as his    Her  is    Review of Systems   HENT:  Negative for hearing loss.    Respiratory:  Positive for cough. Negative for shortness of breath.         See HPI    Cardiovascular:  Positive for leg swelling. Negative for chest pain.   Gastrointestinal:  Negative for constipation and diarrhea.   Genitourinary:         She began taking [place] and does not have any more UTI.    Musculoskeletal:         She has aches and pains that she attributes to old age.    Neurological:  Negative for headaches.         Current Outpatient Medications:     bisoproloL-hydrochlorothiazide (Ziac) 2.5-6.25 mg tablet, Take 1 tablet by mouth once daily., Disp: , Rfl:     calcium carbonate-vitamin D3 (Oyster Shell) 250 mg-3.125 mcg (125 unit) tablet, Take 1 tablet by mouth 2 times daily (morning and late " "afternoon)., Disp: , Rfl:     hydroCHLOROthiazide (HYDRODiuril) 25 mg tablet, Take 1 tablet (25 mg) by mouth once daily., Disp: , Rfl:     losartan (Cozaar) 50 mg tablet, Take 2 tablets (100 mg) by mouth once daily., Disp: , Rfl:     omega 3-dha-epa-fish oil (Fish OiL) 1,000 mg (120 mg-180 mg) capsule, Take 1 capsule (1,000 mg) by mouth once daily., Disp: , Rfl:     pantoprazole (ProtoNix) 40 mg EC tablet, Take 1 tablet (40 mg) by mouth once daily in the morning. Take before meals. Do not crush, chew, or split., Disp: , Rfl:     propranolol (Inderal) 20 mg tablet, Take 1 tablet (20 mg) by mouth 3 times a day., Disp: , Rfl:     Objective   /65   Pulse 62   Ht 1.594 m (5' 2.75\") Comment: w/ shoes.  Wt 68.9 kg (151 lb 12.8 oz)   SpO2 95%   BMI 27.10 kg/m²     Physical Exam  Constitutional:       Appearance: Normal appearance.   HENT:      Mouth/Throat:      Mouth: Mucous membranes are moist.      Pharynx: Oropharynx is clear.   Eyes:      Conjunctiva/sclera: Conjunctivae normal.      Pupils: Pupils are equal, round, and reactive to light.   Cardiovascular:      Rate and Rhythm: Normal rate and regular rhythm.      Heart sounds: Normal heart sounds.   Pulmonary:      Effort: Pulmonary effort is normal.      Breath sounds: Normal breath sounds.   Abdominal:      General: Bowel sounds are normal. There is no distension.      Palpations: Abdomen is soft. There is no mass.      Tenderness: There is no abdominal tenderness.   Lymphadenopathy:      Cervical: No cervical adenopathy.   Skin:     General: Skin is warm and dry.   Neurological:      General: No focal deficit present.         Assessment/Plan   Diagnoses and all orders for this visit:  Hyperparathyroidism (Multi) she follows with endocrinology   Benign essential HTN she is on bisoprolol/hydrochlorothiazide and also hydrochlorothiazide 25 mg daily. She still has ankle and calf edema.   Gastroesophageal reflux disease with esophagitis without hemorrhage she " remains on pantoprazole    Primary adenocarcinoma of left lung (Multi) she is following with oncology.   Essential tremor remain on propranolol.      See me again in November or December for Medicare Wellness visti.

## 2024-08-09 NOTE — PATIENT INSTRUCTIONS
Sign release of records from [facility] : Labs for 5 years.     It was nice to meet you today.     See me again in November or December for your medicare wellness visit.

## 2024-09-09 ENCOUNTER — APPOINTMENT (OUTPATIENT)
Dept: GYNECOLOGIC ONCOLOGY | Facility: CLINIC | Age: 89
End: 2024-09-09
Payer: MEDICARE

## 2024-09-09 ENCOUNTER — OFFICE VISIT (OUTPATIENT)
Dept: GYNECOLOGIC ONCOLOGY | Facility: CLINIC | Age: 89
End: 2024-09-09
Payer: MEDICARE

## 2024-09-09 VITALS
TEMPERATURE: 97.9 F | OXYGEN SATURATION: 94 % | HEART RATE: 87 BPM | DIASTOLIC BLOOD PRESSURE: 74 MMHG | WEIGHT: 151.68 LBS | BODY MASS INDEX: 27.91 KG/M2 | RESPIRATION RATE: 16 BRPM | HEIGHT: 62 IN | SYSTOLIC BLOOD PRESSURE: 138 MMHG

## 2024-09-09 DIAGNOSIS — R93.89 THICKENED ENDOMETRIUM: Primary | ICD-10-CM

## 2024-09-09 DIAGNOSIS — C34.92 PRIMARY ADENOCARCINOMA OF LEFT LUNG (MULTI): ICD-10-CM

## 2024-09-09 PROCEDURE — 99215 OFFICE O/P EST HI 40 MIN: CPT | Performed by: OBSTETRICS & GYNECOLOGY

## 2024-09-09 PROCEDURE — 88305 TISSUE EXAM BY PATHOLOGIST: CPT | Mod: TC,STJLAB | Performed by: STUDENT IN AN ORGANIZED HEALTH CARE EDUCATION/TRAINING PROGRAM

## 2024-09-09 ASSESSMENT — PAIN SCALES - GENERAL: PAINLEVEL: 0-NO PAIN

## 2024-09-09 NOTE — PROGRESS NOTES
Patient ID: Roro Pulido is a 89 y.o. female.  Referring Physician: Ryan Newton MD  [address] [place] [address]  Primary Care Provider: Ann Brady DO      Subjective    89 with h/o lung cancer.  Presenting to discuss thickened endometrial stripe.    Denies postmenopausal bleeding.  Had normal paps, denies h/o PMB or HRT.  Recently completed RT for lung cancer.  She denies fever, chills, chest pain, SOB, nausea, vomiting, diarrhea, constipation, dysuria, bloating, fatigue, early satiety, unexpected weight loss, vaginal bleeding or discharge, or any other concerning signs or symptoms.    12-point review of systems obtained and otherwise normal.    2024 TVUS - UTERUS:  The uterus measures 7.8 x 3.8 x 5.3 cm. No uterine masses. Nabothian  cysts in the cervix.      ENDOMETRIUM:  The endometrium measures 0.7 cm. The endometrium is distended with  complex fluid suggestive of blood products measuring 4.4 cm  craniocaudally, 2.3 cm anteroposteriorly.        Past Medical History:   Diagnosis Date    GERD (gastroesophageal reflux disease)     HTN (hypertension)     Lung cancer (Multi)     Vitamin D deficiency      No past surgical history on file.    Social History     Tobacco Use    Smoking status: Former     Current packs/day: 0.00     Average packs/day: 0.5 packs/day for 40.0 years (20.0 ttl pk-yrs)     Types: Cigarettes     Start date:      Quit date:      Years since quittin.7    Smokeless tobacco: Never   Vaping Use    Vaping status: Never Used   Substance Use Topics    Alcohol use: Yes     Alcohol/week: 2.0 standard drinks of alcohol     Types: 2 Glasses of wine per week    Drug use: Never        Family History   Problem Relation Name Age of Onset    Hypertension Mother      Stroke Father      Breast cancer Sister      Lung cancer Sister      Dementia Sister      Hemochromatosis Sister             Objective   BSA: 1.74 meters squared  /74 (BP Location: Right arm, Patient Position:  "Sitting, BP Cuff Size: Adult)   Pulse 87   Temp 36.6 °C (97.9 °F) (Temporal)   Resp 16   Ht (S) 1.577 m (5' 2.09\")   Wt 68.8 kg (151 lb 10.8 oz)   SpO2 94%   BMI 27.66 kg/m²      Family History   Problem Relation Name Age of Onset    Hypertension Mother      Stroke Father      Breast cancer Sister      Lung cancer Sister      Dementia Sister      Hemochromatosis Sister         Roro Pulido  reports that she quit smoking about 40 years ago. Her smoking use included cigarettes. She started smoking about 80 years ago. She has a 20 pack-year smoking history. She has never used smokeless tobacco.  She  reports current alcohol use of about 2.0 standard drinks of alcohol per week.  She  reports no history of drug use.    Physical Exam  Constitutional:       Appearance: Normal appearance. She is normal weight.   HENT:      Head: Normocephalic and atraumatic.   Cardiovascular:      Rate and Rhythm: Normal rate.   Pulmonary:      Effort: Pulmonary effort is normal. No respiratory distress.   Abdominal:      General: Abdomen is flat. Bowel sounds are normal.      Palpations: Abdomen is soft.   Genitourinary:     Comments: Normal vaginal epithelium without masses or lesions, normal appearing cervix.  Musculoskeletal:         General: Normal range of motion.      Cervical back: Normal range of motion.   Skin:     General: Skin is warm and dry.   Neurological:      General: No focal deficit present.      Mental Status: She is alert and oriented to person, place, and time. Mental status is at baseline.   Psychiatric:         Mood and Affect: Mood normal.         Behavior: Behavior normal.         Thought Content: Thought content normal.         Judgment: Judgment normal.       Endometrial biopsy:  Speculum inserted, cervix swabbed with betadine, pipelle could not be inserted initially, tenaculum placed, pipelle used to break up scar tissue, pipelle then easily passed to the fundus.  Two passses taken, both with copious " amount of mucous.    Performance Status:  Asymptomatic    Assessment/Plan    89 with h/o lung cancer.  Presenting to discuss thickened endometrial stripe.    Oncology History   Primary adenocarcinoma of left lung (Multi)   1/23/2024 Initial Diagnosis    Primary adenocarcinoma of left lung (CMS/HCC)     1/23/2024 Cancer Staged    Staging form: Lung, AJCC 8th Edition, Clinical stage from 1/23/2024: Stage IA3 (cT1c, cN0, cM0) - Signed by Ryan Newton MD on 1/25/2024       -We discussed this may represent benign, precancerous, or cancerous etiologies, however favor a benign process given no bleeding and no evidence of blood in endometrial fluid during biopsy  -Patient is travelling to [place] with her daughter from Sept 21 to Oct 1.  -Will schedule virtual follow up for when she comes back from [place], will call and let her know earlier if appropriate.    Kai Devine MD  Seen with Dr. Burger    I saw and evaluated the patient. I personally obtained the key and critical portions of the history and physical exam or was physically present for key and critical portions performed by the resident/fellow. I reviewed the resident/fellow's documentation and discussed the patient with the resident/fellow. I agree with the resident/fellow's medical decision making as documented in the note.

## 2024-09-13 LAB
LABORATORY COMMENT REPORT: NORMAL
PATH REPORT.FINAL DX SPEC: NORMAL
PATH REPORT.GROSS SPEC: NORMAL
PATH REPORT.RELEVANT HX SPEC: NORMAL
PATH REPORT.TOTAL CANCER: NORMAL

## 2024-09-16 ENCOUNTER — TELEPHONE (OUTPATIENT)
Dept: GYNECOLOGIC ONCOLOGY | Facility: HOSPITAL | Age: 89
End: 2024-09-16
Payer: MEDICARE

## 2024-09-16 ENCOUNTER — APPOINTMENT (OUTPATIENT)
Dept: GYNECOLOGIC ONCOLOGY | Facility: CLINIC | Age: 89
End: 2024-09-16
Payer: MEDICARE

## 2024-09-16 NOTE — TELEPHONE ENCOUNTER
Phoned patient to notify that EMB is negative.    Message left on patient voice mail with negative results and requested a return phone call to discuss follow up recommendations:   Keep follow up as scheduled or return to office as needed is bleeding resumes.

## 2024-10-07 ENCOUNTER — TELEMEDICINE (OUTPATIENT)
Dept: GYNECOLOGIC ONCOLOGY | Facility: CLINIC | Age: 89
End: 2024-10-07
Payer: MEDICARE

## 2024-10-07 DIAGNOSIS — R93.89 THICKENED ENDOMETRIUM: ICD-10-CM

## 2024-10-07 DIAGNOSIS — C34.92 PRIMARY ADENOCARCINOMA OF LEFT LUNG (MULTI): Primary | ICD-10-CM

## 2024-10-07 PROCEDURE — 1160F RVW MEDS BY RX/DR IN RCRD: CPT | Performed by: OBSTETRICS & GYNECOLOGY

## 2024-10-07 PROCEDURE — 1159F MED LIST DOCD IN RCRD: CPT | Performed by: OBSTETRICS & GYNECOLOGY

## 2024-10-07 PROCEDURE — 99202 OFFICE O/P NEW SF 15 MIN: CPT | Performed by: OBSTETRICS & GYNECOLOGY

## 2024-10-07 NOTE — PROGRESS NOTES
Consent:  Verbal consent was requested and obtained from patient on this date for a telehealth visit.    Patient ID: Roro Pulido is a 89 y.o. female.  Referring Physician: No referring provider defined for this encounter.  Primary Care Provider: Ann Brady DO    Subjective    89 with h/o lung cancer.  Presenting to discuss thickened endometrial stripe.     Denies postmenopausal bleeding.  Had normal paps, denies h/o PMB or HRT.  Recently completed RT for lung cancer.  She denies fever, chills, chest pain, SOB, nausea, vomiting, diarrhea, constipation, dysuria, bloating, fatigue, early satiety, unexpected weight loss, vaginal bleeding or discharge, or any other concerning signs or symptoms.     12-point review of systems obtained and otherwise normal.     6/2024 TVUS - UTERUS:  The uterus measures 7.8 x 3.8 x 5.3 cm. No uterine masses. Nabothian  cysts in the cervix.  ENDOMETRIUM: The endometrium measures 0.7 cm. The endometrium is distended with complex fluid suggestive of blood products measuring 4.4 cmcraniocaudally, 2.3 cm anteroposteriorly.  9/9/24 EMB - mucus/inflammatory cells, no endometrial tissue          HPI    Objective    BSA: There is no height or weight on file to calculate BSA.  There were no vitals taken for this visit.     Physical Exam    Performance Status:  Asymptomatic    Assessment/Plan    Oncology History   Primary adenocarcinoma of left lung (Multi)   1/23/2024 Initial Diagnosis    Primary adenocarcinoma of left lung (CMS/HCC)     1/23/2024 Cancer Staged    Staging form: Lung, AJCC 8th Edition, Clinical stage from 1/23/2024: Stage IA3 (cT1c, cN0, cM0) - Signed by Ryan Newton MD on 1/25/2024       Cancer Staging   Primary adenocarcinoma of left lung (Multi)  Staging form: Lung, AJCC 8th Edition  - Clinical stage from 1/23/2024: Stage IA3 (cT1c, cN0, cM0) - Signed by Ryan Newton MD on 1/25/2024       Problem List Items Addressed This Visit             ICD-10-CM    Primary  adenocarcinoma of left lung (Multi) - Primary C34.92    Thickened endometrium R93.89        Treatment Plans       No treatment plans exist        Doing well.  Biopsy reviewed - no definitive endometrial component, but fluid c/w findings on US.  No symptoms.  In absence of symptoms, patient does not desire any additional work up.    Follow up as needed.  I spent 15 min in telephone discussion with the patient.

## 2024-10-10 ENCOUNTER — HOSPITAL ENCOUNTER (OUTPATIENT)
Dept: RADIOLOGY | Facility: CLINIC | Age: 89
Discharge: HOME | End: 2024-10-10
Payer: MEDICARE

## 2024-10-10 DIAGNOSIS — C34.92 PRIMARY ADENOCARCINOMA OF LEFT LUNG (MULTI): ICD-10-CM

## 2024-10-10 PROCEDURE — 71250 CT THORAX DX C-: CPT

## 2024-10-21 ENCOUNTER — HOSPITAL ENCOUNTER (OUTPATIENT)
Dept: RADIATION ONCOLOGY | Facility: CLINIC | Age: 89
Setting detail: RADIATION/ONCOLOGY SERIES
Discharge: HOME | End: 2024-10-21
Payer: MEDICARE

## 2024-10-21 ENCOUNTER — APPOINTMENT (OUTPATIENT)
Dept: RADIATION ONCOLOGY | Facility: CLINIC | Age: 89
End: 2024-10-21
Payer: MEDICARE

## 2024-10-21 VITALS
OXYGEN SATURATION: 95 % | HEART RATE: 74 BPM | TEMPERATURE: 97.5 F | BODY MASS INDEX: 27.32 KG/M2 | WEIGHT: 149.8 LBS | SYSTOLIC BLOOD PRESSURE: 148 MMHG | DIASTOLIC BLOOD PRESSURE: 67 MMHG | RESPIRATION RATE: 18 BRPM

## 2024-10-21 DIAGNOSIS — C34.92 PRIMARY ADENOCARCINOMA OF LEFT LUNG (MULTI): Primary | ICD-10-CM

## 2024-10-21 PROCEDURE — G2211 COMPLEX E/M VISIT ADD ON: HCPCS | Performed by: STUDENT IN AN ORGANIZED HEALTH CARE EDUCATION/TRAINING PROGRAM

## 2024-10-21 PROCEDURE — 99213 OFFICE O/P EST LOW 20 MIN: CPT | Performed by: STUDENT IN AN ORGANIZED HEALTH CARE EDUCATION/TRAINING PROGRAM

## 2024-10-21 ASSESSMENT — ENCOUNTER SYMPTOMS
NEUROLOGICAL NEGATIVE: 1
MUSCULOSKELETAL NEGATIVE: 1
CARDIOVASCULAR NEGATIVE: 1
ENDOCRINE NEGATIVE: 1
CONSTITUTIONAL NEGATIVE: 1
PSYCHIATRIC NEGATIVE: 1
EYES NEGATIVE: 1
HEMATOLOGIC/LYMPHATIC NEGATIVE: 1
GASTROINTESTINAL NEGATIVE: 1
COUGH: 1
SHORTNESS OF BREATH: 1

## 2024-10-21 ASSESSMENT — PAIN SCALES - GENERAL: PAINLEVEL_OUTOF10: 0-NO PAIN

## 2024-10-21 NOTE — PROGRESS NOTES
Radiation Oncology Follow-Up    Patient Name:  Roro Pulido  MRN:  [id number]  :  12/10/1934    Primary Care Provider: Ann Brady DO  Care Team: Patient Care Team:  Ann Brady DO as PCP - General (Internal Medicine)  Stephanie Burger MD as Obstetrician (Obstetrics and Gynecology)    Date of Service: 10/21/2024     SUBJECTIVE  History of Present Illness:   Roro Pulido is a 89 y.o. female with adenocarcinoma of the left upper lobe lung, [id number] Stage 1A3. S/p SBRT 54 Gy in 3 fx completed 2024.  She returns today for follow-up. Since my last discussion with the patient, she is established with GYN oncology due to endometrial thickening with complex fluid in the uterus.  She underwent endometrial biopsy which showed no evidence of malignancy. Follow-up CT of the chest without contrast on 10/10/2024 showed no evidence of local recurrence of the treated BUCK malignancy, increase consolidation of the left upper lung field which on my review is most consistent with postradiation changes., and stable 13mm LLL nodule.  She states she is doing well with some baseline dyspnea on exertion which has been present since prior to radiotherapy, and an occasional dry cough.  She denies new or worsening chest pains.  Appetite is stable.  She is trying to stay active, walking frequently.       Treatment Rendered:   SBRT: Left Upper lobe of lung (Resolved)    Treatment Period Technique Fraction Dose Fractions Total Dose   Course 1 2024-2024  (days elapsed: 5)         BUCK lung 2024-2024 SBRT 1800 / 1,800 cGy 3 / 3 5400 / 5,400 cGy       Review of Systems:   Review of Systems - Oncology  - please see nursing note    Performance Status:   The Karnofsky performance scale today is 70, Cares for self; unable to carry on normal activity or to do active work (ECOG equivalent 1).      OBJECTIVE  /67   Pulse 74   Temp 36.4 °C (97.5 °F)   Resp 18   Wt 67.9 kg (149 lb 12.8 oz)   SpO2 95%   BMI 27.32  kg/m²    Physical Exam  Vitals reviewed.   Constitutional:       General: She is not in acute distress.  HENT:      Head: Normocephalic and atraumatic.   Cardiovascular:      Rate and Rhythm: Normal rate and regular rhythm.   Pulmonary:      Effort: Pulmonary effort is normal. No respiratory distress.      Breath sounds: No wheezing.   Abdominal:      General: There is no distension.   Skin:     Findings: No erythema or rash.   Neurological:      Mental Status: She is alert and oriented to person, place, and time.   Psychiatric:         Mood and Affect: Mood normal.         Behavior: Behavior normal.           ASSESSMENT:  Roro Pulido is a 89 y.o. female with adenocarcinoma of the left upper lobe lung, [id number] Stage 1A3. S/p SBRT 54 Gy in 3 fx completed 2/2024.    PLAN:   No evidence of local recurrence in the left upper lobe.  My impression is that there is evolving postradiation change with consolidation in the left upper lobe, and she is not symptomatic from this.  A previously noted left lower lobe nodule is stable in size.  Will continue surveillance with repeat CT of the chest without contrast in 4 months.    NCCN Guidelines were applicable to guide this patients treatment plan. Effort is required for continued longitudinal patient care.      Thank you for allowing me to participate in this patient's care.    Ryan Newton MD  Department of Radiation Oncology  [facility] [[email or web address]]    Portions of this note were generated using voice recognition software, and may be subject to transcription errors.

## 2024-10-21 NOTE — PROGRESS NOTES
Radiation Oncology Nursing Note    Pain: The patient's current pain level was assessed.  They report currently having a pain of 0 out of 10.  They feel their pain is under control without the use of pain medications.    Review of Systems:  Review of Systems   Constitutional: Negative.    HENT:   Positive for hearing loss.    Eyes: Negative.    Respiratory:  Positive for cough and shortness of breath.    Cardiovascular: Negative.    Gastrointestinal: Negative.    Endocrine: Negative.    Genitourinary: Negative.     Musculoskeletal: Negative.    Skin: Negative.    Neurological: Negative.    Hematological: Negative.    Psychiatric/Behavioral: Negative.

## 2024-10-25 ENCOUNTER — APPOINTMENT (OUTPATIENT)
Dept: PRIMARY CARE | Facility: CLINIC | Age: 89
End: 2024-10-25
Payer: MEDICARE

## 2024-10-25 VITALS
SYSTOLIC BLOOD PRESSURE: 138 MMHG | BODY MASS INDEX: 28.01 KG/M2 | WEIGHT: 152.2 LBS | DIASTOLIC BLOOD PRESSURE: 70 MMHG | HEART RATE: 73 BPM | HEIGHT: 62 IN | RESPIRATION RATE: 16 BRPM

## 2024-10-25 DIAGNOSIS — Z00.00 ENCOUNTER FOR PREVENTIVE HEALTH EXAMINATION: ICD-10-CM

## 2024-10-25 DIAGNOSIS — Z13.89 ENCOUNTER FOR SCREENING FOR OTHER DISORDER: ICD-10-CM

## 2024-10-25 DIAGNOSIS — Z71.89 CARDIAC RISK COUNSELING: ICD-10-CM

## 2024-10-25 DIAGNOSIS — I10 BENIGN ESSENTIAL HTN: Primary | ICD-10-CM

## 2024-10-25 DIAGNOSIS — G25.0 ESSENTIAL TREMOR: ICD-10-CM

## 2024-10-25 DIAGNOSIS — Z23 FLU VACCINE NEED: ICD-10-CM

## 2024-10-25 DIAGNOSIS — C34.92 PRIMARY ADENOCARCINOMA OF LEFT LUNG (MULTI): ICD-10-CM

## 2024-10-25 DIAGNOSIS — K21.00 GASTROESOPHAGEAL REFLUX DISEASE WITH ESOPHAGITIS WITHOUT HEMORRHAGE: ICD-10-CM

## 2024-10-25 DIAGNOSIS — E55.9 VITAMIN D DEFICIENCY: ICD-10-CM

## 2024-10-25 DIAGNOSIS — R13.19 ESOPHAGEAL DYSPHAGIA: ICD-10-CM

## 2024-10-25 PROCEDURE — 1159F MED LIST DOCD IN RCRD: CPT | Performed by: INTERNAL MEDICINE

## 2024-10-25 PROCEDURE — 1158F ADVNC CARE PLAN TLK DOCD: CPT | Performed by: INTERNAL MEDICINE

## 2024-10-25 PROCEDURE — 1123F ACP DISCUSS/DSCN MKR DOCD: CPT | Performed by: INTERNAL MEDICINE

## 2024-10-25 PROCEDURE — 3075F SYST BP GE 130 - 139MM HG: CPT | Performed by: INTERNAL MEDICINE

## 2024-10-25 PROCEDURE — 1160F RVW MEDS BY RX/DR IN RCRD: CPT | Performed by: INTERNAL MEDICINE

## 2024-10-25 PROCEDURE — G0008 ADMIN INFLUENZA VIRUS VAC: HCPCS | Performed by: INTERNAL MEDICINE

## 2024-10-25 PROCEDURE — 3078F DIAST BP <80 MM HG: CPT | Performed by: INTERNAL MEDICINE

## 2024-10-25 PROCEDURE — G0442 ANNUAL ALCOHOL SCREEN 15 MIN: HCPCS | Performed by: INTERNAL MEDICINE

## 2024-10-25 PROCEDURE — G0439 PPPS, SUBSEQ VISIT: HCPCS | Performed by: INTERNAL MEDICINE

## 2024-10-25 PROCEDURE — 99213 OFFICE O/P EST LOW 20 MIN: CPT | Performed by: INTERNAL MEDICINE

## 2024-10-25 PROCEDURE — 1170F FXNL STATUS ASSESSED: CPT | Performed by: INTERNAL MEDICINE

## 2024-10-25 PROCEDURE — 90662 IIV NO PRSV INCREASED AG IM: CPT | Performed by: INTERNAL MEDICINE

## 2024-10-25 PROCEDURE — G0446 INTENS BEHAVE THER CARDIO DX: HCPCS | Performed by: INTERNAL MEDICINE

## 2024-10-25 ASSESSMENT — ENCOUNTER SYMPTOMS
OCCASIONAL FEELINGS OF UNSTEADINESS: 0
DIARRHEA: 0
HEADACHES: 0
SHORTNESS OF BREATH: 1
DYSURIA: 0
CONSTIPATION: 0
DIZZINESS: 0
COUGH: 0

## 2024-10-25 ASSESSMENT — PATIENT HEALTH QUESTIONNAIRE - PHQ9
SUM OF ALL RESPONSES TO PHQ9 QUESTIONS 1 AND 2: 0
2. FEELING DOWN, DEPRESSED OR HOPELESS: NOT AT ALL
SUM OF ALL RESPONSES TO PHQ9 QUESTIONS 1 AND 2: 0
2. FEELING DOWN, DEPRESSED OR HOPELESS: NOT AT ALL
1. LITTLE INTEREST OR PLEASURE IN DOING THINGS: NOT AT ALL
1. LITTLE INTEREST OR PLEASURE IN DOING THINGS: NOT AT ALL

## 2024-10-25 ASSESSMENT — ACTIVITIES OF DAILY LIVING (ADL)
TAKING_MEDICATION: INDEPENDENT
MANAGING_FINANCES: INDEPENDENT
GROCERY_SHOPPING: INDEPENDENT
DRESSING: INDEPENDENT
DOING_HOUSEWORK: INDEPENDENT
BATHING: INDEPENDENT

## 2024-10-25 ASSESSMENT — LIFESTYLE VARIABLES
HOW OFTEN DO YOU HAVE A DRINK CONTAINING ALCOHOL: MONTHLY OR LESS
HOW MANY STANDARD DRINKS CONTAINING ALCOHOL DO YOU HAVE ON A TYPICAL DAY: 1 OR 2
HOW OFTEN DO YOU HAVE SIX OR MORE DRINKS ON ONE OCCASION: NEVER
AUDIT-C TOTAL SCORE: 1
SKIP TO QUESTIONS 9-10: 1

## 2024-10-25 NOTE — PATIENT INSTRUCTIONS
See me again in 6 months for 30 min visit.    Closer to that visit, get blood test done after fasting overnight.    The  lab is on the first floor.  Lab hours are 7 am to 4 pm Mon-Fri and 8am to Noon on Saturday.  No appt is needed.  Orders are entered into the system so you do not need a paper order.

## 2024-10-25 NOTE — PROGRESS NOTES
"Subjective   Reason for Visit: Roro Pulido is an 89 y.o. female here for a Medicare Wellness visit.               HPI    She said she found  out on Monday that she was cancer free  She saw Dr Newton, her radiation oncologist,  who told her there was no evidence of local recurrence in her left upper lobe. She is pleased.     She had endometrial bx Sept 2024 that showed reactive endometrium only    Depression screening was completed today using PHQ-2 and was negative.  Falls risk was assessed today. Pt does not use ambulatory aids.   Home safety measures were addressed today. No concerns.  Functional status was addressed and no concerns per patient today.  She finds she \"says what\" more than she should   She said her last hearing test was about 1.5 years ago ; was done in [place]  Nutritional status completed today.    Alcohol screening was performed today taking 5 min of time.   Pt is independent with finances, medications and transportation.   Pt lives in own home.      The ASCVD Risk score (Yeny DK, et al., 2019) failed to calculate for the following reasons:    The 2019 ASCVD risk score is only valid for ages 40 to 79     For about 15 minutes, cardiovascular risks was discussed . This patient's  10 year CV risk is     %. If needed, lifestyle modifications recommended, including nutritional choices, exercise, and elimination of habits contributing to risk.  We agreed on a plan to reduce the current cardiovascular risk based on the above discussion as needed.    Low dose aspirin as primary prevention was discussed and was / was not recommended for this patient .      Advanced Care Planning ( including a Living will, Healthcare POA, as well as specific end of life choices or directives) , was discussed with the patient .   The Pt. Has Living Will/HCPOA  is her daughter Dipti Quintanilal    Patient Care Team:  Ann Brady DO as PCP - General (Internal Medicine)  Stephanie Burger MD as Obstetrician (Obstetrics and " "Gynecology)     Review of Systems   HENT:  Positive for hearing loss.    Respiratory:  Positive for shortness of breath. Negative for cough.         Her sob is due to chronic lung issues. She says has more sob when she is out somewhere, can't walk really fast.    Cardiovascular:  Negative for chest pain.   Gastrointestinal:  Negative for constipation and diarrhea.   Genitourinary:  Negative for dysuria.        She does have leakage with cough or sneeze. Wears a pad.    Neurological:  Negative for dizziness and headaches.       Objective   Vitals:  /70 (BP Location: Left arm, Patient Position: Sitting, BP Cuff Size: Adult)   Pulse 73   Resp 16   Ht 1.575 m (5' 2\")   Wt 69 kg (152 lb 3.2 oz)   BMI 27.84 kg/m²       Physical Exam  Constitutional:       Appearance: Normal appearance.   HENT:      Right Ear: Tympanic membrane, ear canal and external ear normal.      Left Ear: Tympanic membrane, ear canal and external ear normal.      Mouth/Throat:      Mouth: Mucous membranes are moist.      Pharynx: Oropharynx is clear.   Eyes:      Conjunctiva/sclera: Conjunctivae normal.      Pupils: Pupils are equal, round, and reactive to light.   Cardiovascular:      Rate and Rhythm: Normal rate and regular rhythm.      Heart sounds: Normal heart sounds.   Pulmonary:      Effort: Pulmonary effort is normal.      Breath sounds: Normal air entry. Examination of the left-lower field reveals rales. Rales present.      Comments: Few inspiratory crackles. Otherwise clear   Abdominal:      General: Bowel sounds are normal. There is no distension.      Palpations: Abdomen is soft. There is no mass.      Tenderness: There is no abdominal tenderness.   Lymphadenopathy:      Cervical: No cervical adenopathy.   Skin:     General: Skin is warm and dry.   Neurological:      General: No focal deficit present.         Assessment/Plan   Problem List Items Addressed This Visit             ICD-10-CM     Encounter for preventive health " examination  : [facility] wellness visit completed today.        In addition to Medicare Wellness visit, the following issues were addressed with separate E/M and treatment decision such as refills or  tests were ordered:   Primary adenocarcinoma of left lung (Multi) she saw her radiation oncologist who found no evidence of cancer anymore so this is great.     Essential tremor she remains on propranolol 20 mg 3 times daily.  She says it is getting a little worse but she can still deal with it.     Gastroesophageal reflux disease with esophagitis without hemorrhage remains on pantoprazole and says she is doing well  Esophageal dysphagia: She is doing well on pantoprazole.     Benign essential HTN remain on hydrochlorothiazide 25 mg daily and she has also been on bisoprolol/hydrochlorothiazide 2.5-6.25.  She told the MA that she was not taking it but she told me that she is taking it.  BP is fairly controlled at 138/70.     Relevant Orders    Comprehensive Metabolic Panel    Lipid Panel    TSH with reflex to Free T4 if abnormal    CBC and Auto Differential     Other Visit Diagnoses         Codes    Flu vaccine need     Z23    Relevant Orders    Flu vaccine, trivalent, preservative free, HIGH-DOSE, age 65y+ (Fluzone) (Completed)

## 2025-01-07 ENCOUNTER — TELEPHONE (OUTPATIENT)
Dept: PRIMARY CARE | Facility: CLINIC | Age: OVER 89
End: 2025-01-07
Payer: MEDICARE

## 2025-01-07 DIAGNOSIS — I10 BENIGN ESSENTIAL HTN: Primary | ICD-10-CM

## 2025-01-07 DIAGNOSIS — K21.00 GASTROESOPHAGEAL REFLUX DISEASE WITH ESOPHAGITIS WITHOUT HEMORRHAGE: ICD-10-CM

## 2025-01-07 NOTE — TELEPHONE ENCOUNTER
Patient called and states that she got a notification from her pharmacy that Dr Brady cancelled all her prescriptions. Patient does not understand why this was done and would like someone to explain what is going on.    Roro Pulido  [phone]

## 2025-01-08 RX ORDER — TERAZOSIN 1 MG/1
1 CAPSULE ORAL
COMMUNITY
Start: 2023-12-06 | End: 2025-01-08 | Stop reason: SDUPTHER

## 2025-01-08 RX ORDER — TERAZOSIN 1 MG/1
1 CAPSULE ORAL NIGHTLY
Qty: 90 CAPSULE | Refills: 3 | Status: SHIPPED | OUTPATIENT
Start: 2025-01-08

## 2025-01-08 RX ORDER — LOSARTAN POTASSIUM 50 MG/1
100 TABLET ORAL DAILY
Qty: 90 TABLET | Refills: 3 | Status: SHIPPED | OUTPATIENT
Start: 2025-01-08

## 2025-01-08 RX ORDER — HYDROCHLOROTHIAZIDE 25 MG/1
25 TABLET ORAL DAILY
Qty: 90 TABLET | Refills: 3 | Status: SHIPPED | OUTPATIENT
Start: 2025-01-08

## 2025-01-08 RX ORDER — PANTOPRAZOLE SODIUM 40 MG/1
40 TABLET, DELAYED RELEASE ORAL
Qty: 90 TABLET | Refills: 3 | Status: SHIPPED | OUTPATIENT
Start: 2025-01-08

## 2025-01-08 RX ORDER — PROPRANOLOL HYDROCHLORIDE 20 MG/1
20 TABLET ORAL 3 TIMES DAILY
Qty: 90 TABLET | Refills: 3 | Status: SHIPPED | OUTPATIENT
Start: 2025-01-08

## 2025-01-15 ENCOUNTER — TELEPHONE (OUTPATIENT)
Dept: PRIMARY CARE | Facility: CLINIC | Age: OVER 89
End: 2025-01-15
Payer: MEDICARE

## 2025-01-15 NOTE — TELEPHONE ENCOUNTER
Daughter calling stating she thinks her mom may have had a mini stroke a week ago. She went on vacation and forgot all of her meds and the daughter is concerned this impacted her health. She is asking to speak wit you and what she can do to find out if she did have a stroke. She also stated her mom is being very stubborn about an office visit.

## 2025-01-16 ENCOUNTER — DOCUMENTATION (OUTPATIENT)
Dept: PRIMARY CARE | Facility: CLINIC | Age: OVER 89
End: 2025-01-16
Payer: MEDICARE

## 2025-01-16 DIAGNOSIS — I10 BENIGN ESSENTIAL HTN: ICD-10-CM

## 2025-01-16 NOTE — PROGRESS NOTES
Med list 11/15/2024  Medications   What How Much When Why Instructions   Unchanged albuterol = Proventil, Ventolin (Albuterol (Eqv-ProAir HFA) 90 mcg/ inh inhalation aerosol) 2 Puffs Inhalation EVERY SIX HOURS Contact prescribing physician if questions or concerns   Unchanged bisoprolol-hydrochlorothiazide (bisoprolol-hydrochlorothiazide = Ziac 2.5 mg-6.25 mg oral tab) 1 Tabs Oral DAILY Hypertension Contact prescribing physician if questions or concerns   Unchanged cholecalciferol (Vitamin D3 1000 intl units oral tablet) 1 Tabs Oral DAILY Contact prescribing physician if questions or concerns   Unchanged hydrochlorothiazide = HydroDIURIL (hydroCHLOROthiazide 25 mg oral tablet) 1 Tabs Oral DAILY Hypertension Contact prescribing physician if questions or concerns   Unchanged losartan (losartan 100 mg oral tablet) 1 Tabs Oral DAILY Hypertension Duration: 90 Days Contact prescribing physician if questions or concerns   Unchanged melatonin (melatonin 10 mg oral tablet) 1 Tabs Oral AT BEDTIME Contact prescribing physician if questions or concerns   Unchanged pantoprazole (pantoprazole 40 mg oral delayed release tablet) 1 Tabs Oral DAILY Chronic GERD Contact prescribing physician if questions or concerns   Unchanged propranolol (propranolol 20 mg oral tablet = Inderal) 1 Tabs Oral DAILY Tremor Contact prescribing physician if questions or concerns   Unchanged terazosin = Hytrin (terazosin 1 mg oral capsule) 3 Capsules Oral AT BEDTIME Hypertension Contact prescribing physician if questions or concerns     Test Results   Estimated Creatinine Clearance - 33.04 mL/min (05/15/2024)     Medications Administered   Oncology Visit Instructions-Medications:   Call your provider right away...   If you are recieving cancer treatment and   *   Have a fever of 100.4 or above or chills   *   Are experiencing symptoms or side effects related to your treatment that are new or worsening   *   [facility]: [phone]     Common  Emergency Awareness Tips   IS IT A STROKE?   Act FAST and Check for these signs:   FACE   Does the face look uneven?   ARM   Does one arm drift down?   SPEECH   Does their speech sound strange?   TIME   Call 9-1-1 at any sign of stroke     Heart Attack Signs   Chest discomfort: Most heart attacks involve discomfort in the center of the chest and lasts more than a few minutes, or goes away and comes back. It can feel like uncomfortable pressure, squeezing, fullness or pain.   Discomfort in upper body: Symptoms can include pain or discomfort in one or both arms, back, neck, jaw or stomach.   Shortness of breath: With or without discomfort.   Other signs: Breaking out in a cold sweat, nausea, or lightheaded.   Remember, MINUTES DO MATTER. If you experience any of these heart attack warning signs, call 9-1-1 to get immediate medical attention!

## 2025-01-16 NOTE — PROGRESS NOTES
Lipids 5/30/24  ---------------------  From: DANNIE VILLARREAL CNP  To: Juana Rizo MA;  Sent: 05/30/2024 22:47:37 EDT  Subject: results  Caller Name: JOEY ADAME; Caller Number: [phone]  Labs are good  Results:  Date Result Name Value Ref Range  05/30/2024 9:39 Cholesterol 187 mg/dL (100 - 200)  05/30/2024 9:39 Triglycerides 139 mg/dL (30 - 150)  05/30/2024 9:39 HDL Cholesterol 40 mg/dL (40 - 60)  05/30/2024 9:39 Calculated LDL Cholesterol 119 mg/dL (60 - 130)  05/30/2024 9:39 Total Chol/HDL Chol Ratio 4.7  called and left message for patient to contact office for results  patient returned call and was informed of results Normal   [facility]                                                                                                    LIPID PNL on 05-    Calculated LDL Cholesterol 119 mg/dL Normal  [facility]                                                                                                   Comment on above: Order Comment: Ordered on Fin# [id number]     Result Comment: <100 mg/dl Optimal  100-129 mg/dl Near Optimal  130-159 mg/dl Borderline High  160-189 mg/dl High  >=190 mg/dl Very High

## 2025-01-16 NOTE — TELEPHONE ENCOUNTER
Pt is confused on how to take medications.    The Terazosin she is taking 3 tablets at night of the 1 mg and it was sent in for one time.    The Pantoprazole she is taking once a day and it was sent to 3 times a day.    The Losartan was send in for 2 tablets daily and she said she only takes one. Is she taking 50 mg or 100 mg? If it is 100 mg the quantity is not right.    The Bisoprolol was not sent should she be taking that. It says she is not taking it.

## 2025-01-16 NOTE — PROGRESS NOTES
Labs 4/24/24   BUN   21 mg/dL   04/24/24   Na   140 mmol/L   04/24/24   K   3.3 mmol/L   04/24/24   Chloride   102 mmol/L   04/24/24   CO2, venous   29.0 mmol/L   04/24/24   Creatinine   1.0 mg/dL   04/24/24   Total Protein   7.1 g/dL   04/24/24   Calcium   10.8 mg/dL   04/24/24   Bilirubin, Total   0.70 mg/dL   04/24/24   Alk Phos   80 unit/L   04/24/24   GOT   23 unit/L   04/24/24   GPT   18 unit/L   04/24/24   BUN/Creat Ratio   21.0   04/24/24   Calculated Osmolality   283 mOsm/kg   04/24/24   Globulin   3.6 g/dL   04/24/24   A/G Ratio   1.0   04/24/24   ALB   3.5 g/dL   04/24/24   Glomerular Filtration Rate   52 mL/min/1.73m?   04/24/24   Glucose   111 mg/dL   04/24/24   GFR AA   >60   04/24/24   Estimated Creatinine Clearance   33.04 mL/min   05/15/24   Hematology       WBC   7.2 x103/uL   04/24/24   RBC   3.72 x106/uL   04/24/24   HGB   12.0 g/dL   04/24/24   HCT   34.8 %   04/24/24   MCV   93.7 fL   04/24/24   MCH   32.3 pg   04/24/24   MCHC   34.5 g/dL   04/24/24   RDW   13.1 %   04/24/24   Platelet   180 x103/uL   04/24/24   MPV   7.9 fL   04/24/24   Lymph %   16.7 %   04/24/24   Mono %   8.5 %   04/24/24   Neutrophil %   72.4 %   04/24/24   Eosin %   1.7 %   04/24/24   Basos %   0.8 %   04/24/24   Lymph Count   1.20 x1000   04/24/24   Mono Count   0.61 x1000   04/24/24   Neutrophil Count (ANC)   5.21 x1000   04/24/24   Eos Count   0.12 x1000   04/24/24   Baso Count   0.06 x1000   04/24/24   Nucleated RBC   0 /100WBC   04/24/24

## 2025-01-17 RX ORDER — BISOPROLOL FUMARATE AND HYDROCHLOROTHIAZIDE 2.5; 6.25 MG/1; MG/1
1 TABLET ORAL DAILY
Qty: 90 TABLET | Refills: 1 | Status: SHIPPED | OUTPATIENT
Start: 2025-01-17

## 2025-01-17 RX ORDER — TERAZOSIN 1 MG/1
CAPSULE ORAL
Qty: 270 CAPSULE | Refills: 1 | Status: SHIPPED | OUTPATIENT
Start: 2025-01-17

## 2025-01-17 NOTE — TELEPHONE ENCOUNTER
I spoke with pts daughter and went over medications with her and how she should be taking them. She would like the Terazosin to go to [facility] and Also the Bisoprolol.

## 2025-02-05 NOTE — PROGRESS NOTES
Subjective   Patient ID: Roro Pulido is a 89 y.o. female who presents for follow up for medication review. Last seen 10/25/24.    HPI     She is here today with her daughter who sometimes accompanies to visit.  She brought all pills bottles with her minus Terazosin.  We are going to go over the medications she is currently taking today.  She is taking three tablets of Terazosin 1mg nightly, Losartan 50mg daily, propanolol 20mg daily, hydrochlorothiazide 25mg daily and bisoprolol-hydrochlorothiazide 2.5-6.25mg daily.  Should be taking Losartan 100mg so will increase that today.  Did not take her medications for a week when traveling.  She felt a little bit off for one night at dinner but felt fine after that.  Her daughter did call the office in Jan because she was concerned that this may be due to small stroke. However she did not have any lasting deficit and I told her that we do not need any further work up now.    Was previously seeing doctor at [facility] that passed away.      Review of Systems      Current Outpatient Medications:     bisoproloL-hydrochlorothiazide (Ziac) 2.5-6.25 mg tablet, Take 1 tablet by mouth once daily., Disp: 90 tablet, Rfl: 1    calcium carbonate-vitamin D3 (Oyster Shell) 250 mg-3.125 mcg (125 unit) tablet, Take 1 tablet by mouth 2 times daily (morning and late afternoon)., Disp: , Rfl:     hydroCHLOROthiazide (HYDRODiuril) 25 mg tablet, Take 1 tablet (25 mg) by mouth once daily., Disp: 90 tablet, Rfl: 3    omega 3-dha-epa-fish oil (Fish OiL) 1,000 mg (120 mg-180 mg) capsule, Take 1 capsule (1,000 mg) by mouth once daily., Disp: , Rfl:     pantoprazole (ProtoNix) 40 mg EC tablet, Take 1 tablet (40 mg) by mouth once daily in the morning. Take before meals. Do not crush, chew, or split., Disp: 90 tablet, Rfl: 3    propranolol (Inderal) 20 mg tablet, Take 1 tablet (20 mg) by mouth once daily., Disp: 90 tablet, Rfl: 3    terazosin (Hytrin) 1 mg capsule, Take 3 tablets before bed to equal 3  "mg., Disp: 270 capsule, Rfl: 1    Objective   /72 (BP Location: Left arm, Patient Position: Sitting, BP Cuff Size: Large adult)   Pulse 60   Resp 16   Ht 1.575 m (5' 2\")   Wt 66.5 kg (146 lb 9.6 oz)   BMI 26.81 kg/m²     Physical Exam  Constitutional:       Appearance: Normal appearance.   Eyes:      Conjunctiva/sclera: Conjunctivae normal.      Pupils: Pupils are equal, round, and reactive to light.   Cardiovascular:      Rate and Rhythm: Normal rate and regular rhythm.      Heart sounds: Normal heart sounds.   Pulmonary:      Effort: Pulmonary effort is normal.      Breath sounds: Normal breath sounds.   Lymphadenopathy:      Cervical: No cervical adenopathy.   Neurological:      General: No focal deficit present.         Assessment/Plan   Problem List Items Addressed This Visit          Cardiac and Vasculature    Benign essential HTN     Elevated to 188/75 when arrived to visit.  Rechecked BP in visit today which improved to 130/72  Continue propanolol and increase losartan to two 50mg pills daily   Refilled propanolol today  Obtain labs prior to next visit in April after fasting 8-10 hours  Printed lab slip today           Relevant Medications    propranolol (Inderal) 20 mg tablet    Other Relevant Orders    Comprehensive Metabolic Panel    Lipid Panel    TSH with reflex to Free T4 if abnormal    CBC and Auto Differential     Other Visit Diagnoses       : I am the internal medicine physician who provides ongoing chronic medical care for this physician.  This includes management during office visits and also in between office visits.      Vitamin D deficiency    -  Primary    Relevant Orders    Vitamin D 25-Hydroxy,Total (for eval of Vitamin D levels)            Please return to see me in April months for a 30 minute follow up.     Scribe Attestation  By signing my name below, IRoseanna Scribe   attest that this documentation has been prepared under the direction and in the presence of " Ann Brady DO.

## 2025-02-06 ENCOUNTER — APPOINTMENT (OUTPATIENT)
Dept: PRIMARY CARE | Facility: CLINIC | Age: OVER 89
End: 2025-02-06
Payer: MEDICARE

## 2025-02-06 VITALS
SYSTOLIC BLOOD PRESSURE: 130 MMHG | HEART RATE: 60 BPM | WEIGHT: 146.6 LBS | HEIGHT: 62 IN | DIASTOLIC BLOOD PRESSURE: 72 MMHG | BODY MASS INDEX: 26.98 KG/M2 | RESPIRATION RATE: 16 BRPM

## 2025-02-06 DIAGNOSIS — I10 BENIGN ESSENTIAL HTN: ICD-10-CM

## 2025-02-06 DIAGNOSIS — E55.9 VITAMIN D DEFICIENCY: Primary | ICD-10-CM

## 2025-02-06 PROCEDURE — 99213 OFFICE O/P EST LOW 20 MIN: CPT | Performed by: INTERNAL MEDICINE

## 2025-02-06 PROCEDURE — 1159F MED LIST DOCD IN RCRD: CPT | Performed by: INTERNAL MEDICINE

## 2025-02-06 PROCEDURE — G2211 COMPLEX E/M VISIT ADD ON: HCPCS | Performed by: INTERNAL MEDICINE

## 2025-02-06 PROCEDURE — 1123F ACP DISCUSS/DSCN MKR DOCD: CPT | Performed by: INTERNAL MEDICINE

## 2025-02-06 PROCEDURE — 1036F TOBACCO NON-USER: CPT | Performed by: INTERNAL MEDICINE

## 2025-02-06 PROCEDURE — 3078F DIAST BP <80 MM HG: CPT | Performed by: INTERNAL MEDICINE

## 2025-02-06 PROCEDURE — 1158F ADVNC CARE PLAN TLK DOCD: CPT | Performed by: INTERNAL MEDICINE

## 2025-02-06 PROCEDURE — 3075F SYST BP GE 130 - 139MM HG: CPT | Performed by: INTERNAL MEDICINE

## 2025-02-06 PROCEDURE — 1126F AMNT PAIN NOTED NONE PRSNT: CPT | Performed by: INTERNAL MEDICINE

## 2025-02-06 RX ORDER — PROPRANOLOL HYDROCHLORIDE 20 MG/1
20 TABLET ORAL DAILY
Qty: 90 TABLET | Refills: 3 | Status: SHIPPED | OUTPATIENT
Start: 2025-02-06

## 2025-02-06 ASSESSMENT — PATIENT HEALTH QUESTIONNAIRE - PHQ9
1. LITTLE INTEREST OR PLEASURE IN DOING THINGS: NOT AT ALL
SUM OF ALL RESPONSES TO PHQ9 QUESTIONS 1 AND 2: 0
2. FEELING DOWN, DEPRESSED OR HOPELESS: NOT AT ALL

## 2025-02-06 ASSESSMENT — PAIN SCALES - GENERAL: PAINLEVEL_OUTOF10: 0-NO PAIN

## 2025-02-06 NOTE — PATIENT INSTRUCTIONS
Continue taking all meds the same , except take TWO losartan at one time every day.     Keep next appointment with me.     Please get blood test done after fasting, close to next visit.

## 2025-02-06 NOTE — ASSESSMENT & PLAN NOTE
Elevated to 188/75 when arrived to visit.  Rechecked BP in visit today which improved to 130/72  Continue propanolol and increase losartan to two 50mg pills daily   Refilled propanolol today  Obtain labs prior to next visit in April after fasting 8-10 hours  Printed lab slip today

## 2025-02-21 ENCOUNTER — HOSPITAL ENCOUNTER (OUTPATIENT)
Dept: RADIOLOGY | Facility: CLINIC | Age: OVER 89
Discharge: HOME | End: 2025-02-21
Payer: MEDICARE

## 2025-02-21 DIAGNOSIS — C34.92 PRIMARY ADENOCARCINOMA OF LEFT LUNG (MULTI): ICD-10-CM

## 2025-02-21 PROCEDURE — 71250 CT THORAX DX C-: CPT

## 2025-02-25 ENCOUNTER — HOSPITAL ENCOUNTER (OUTPATIENT)
Dept: RADIATION ONCOLOGY | Facility: CLINIC | Age: OVER 89
Setting detail: RADIATION/ONCOLOGY SERIES
Discharge: HOME | End: 2025-02-25
Payer: MEDICARE

## 2025-02-25 VITALS
SYSTOLIC BLOOD PRESSURE: 156 MMHG | TEMPERATURE: 97.5 F | OXYGEN SATURATION: 93 % | DIASTOLIC BLOOD PRESSURE: 71 MMHG | RESPIRATION RATE: 20 BRPM | WEIGHT: 147.6 LBS | BODY MASS INDEX: 27 KG/M2 | HEART RATE: 68 BPM

## 2025-02-25 DIAGNOSIS — R91.1 NODULE OF LOWER LOBE OF LEFT LUNG: ICD-10-CM

## 2025-02-25 DIAGNOSIS — C34.92 PRIMARY ADENOCARCINOMA OF LEFT LUNG (MULTI): Primary | ICD-10-CM

## 2025-02-25 PROCEDURE — G2211 COMPLEX E/M VISIT ADD ON: HCPCS | Performed by: STUDENT IN AN ORGANIZED HEALTH CARE EDUCATION/TRAINING PROGRAM

## 2025-02-25 PROCEDURE — 99214 OFFICE O/P EST MOD 30 MIN: CPT | Performed by: STUDENT IN AN ORGANIZED HEALTH CARE EDUCATION/TRAINING PROGRAM

## 2025-02-25 ASSESSMENT — ENCOUNTER SYMPTOMS
ENDOCRINE NEGATIVE: 1
GASTROINTESTINAL NEGATIVE: 1
SHORTNESS OF BREATH: 1
MUSCULOSKELETAL NEGATIVE: 1
CARDIOVASCULAR NEGATIVE: 1
HEMATOLOGIC/LYMPHATIC NEGATIVE: 1
PSYCHIATRIC NEGATIVE: 1
CONSTITUTIONAL NEGATIVE: 1
EYES NEGATIVE: 1
NEUROLOGICAL NEGATIVE: 1

## 2025-02-25 ASSESSMENT — PAIN SCALES - GENERAL: PAINLEVEL_OUTOF10: 9

## 2025-02-25 NOTE — PROGRESS NOTES
Radiation Oncology Follow-Up    Patient Name:  Roro Pulido  MRN:  [id number]  :  12/10/1934    Primary Care Provider: Ann Brady DO  Care Team: Patient Care Team:  Ann Brady DO as PCP - General (Internal Medicine)  Stephanie Burger MD as Obstetrician (Obstetrics and Gynecology)    Date of Service: 2025     SUBJECTIVE  History of Present Illness:   Roro Pulido is a 89 y.o. 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 S/p SBRT 54 Gy in 3 fx completed 2024. She returns today for follow-up.    CT chest without contrast on 2025 showed improving bandlike and groundglass opacities in the left upper lobe consistent with evolving postradiation changes.  A previously noted left lower lobe nodule is stable from prior exam at 1.7 x 1.2 cm, however this is larger compared to exam on 2024 where it measured 1.3 x 0.8 cm.   She endorses chronic dyspnea with exertion that has not changed in recent months.  She denies baseline shortness of breath.  No new or worsening chest pains.  She has an occasional cough that is dry or with occasional sputum.       Treatment Rendered:   SBRT: Left Upper lobe of lung (Resolved)    Treatment Period Technique Fraction Dose Fractions Total Dose   Course 1 2024-2024  (days elapsed: 5)         BUCK lung 2024-2024 SBRT 1800 / 1,800 cGy 3 / 3 5400 / 5,400 cGy       Review of Systems:   Review of Systems - Oncology  - please see nursing note    Performance Status:   The Karnofsky performance scale today is 80, Normal activity with effort; some signs or symptoms of disease (ECOG equivalent 1).      OBJECTIVE  /71   Pulse 68   Temp 36.4 °C (97.5 °F)   Resp 20   Wt 67 kg (147 lb 9.6 oz)   SpO2 93%   BMI 27.00 kg/m²    Physical Exam  Vitals reviewed.   Constitutional:       General: She is not in acute distress.  HENT:      Head: Normocephalic and atraumatic.   Cardiovascular:      Rate and Rhythm: Normal rate and  regular rhythm.   Pulmonary:      Effort: Pulmonary effort is normal. No respiratory distress.      Breath sounds: No wheezing.   Abdominal:      General: There is no distension.   Skin:     Findings: No erythema.   Neurological:      Mental Status: She is alert and oriented to person, place, and time.   Psychiatric:         Mood and Affect: Mood normal.         Behavior: Behavior normal.           ASSESSMENT:  Roro Pulido is a 89 y.o. 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 S/p SBRT 54 Gy in 3 fx completed 2/2024.  Subsequently a left lower lobe nodule increased in size, now 1.7 x 1.2 cm.    PLAN:   CT imaging reviewed. The treated left upper lobe malignancy is controlled with improving postradiation changes.  The left lower lobe nodule is slowly increasing in size over time.  I recommend a CT-guided biopsy, and a PET/CT for restaging.  She is in agreement with the plan.  I will follow-up with her after biopsy is done to discuss results.    NCCN Guidelines were applicable to guide this patients treatment plan. Effort is required for continued longitudinal patient care.    Thank you for allowing me to participate in this patient's care.    Ryan Newton MD  Department of Radiation Oncology  [facility]    Portions of this note were generated using voice recognition software, and may be subject to transcription errors.

## 2025-02-25 NOTE — PROGRESS NOTES
Radiation Oncology Nursing Note    Pain: The patient's current pain level was assessed.  They report currently having a pain of 9 out of 10.  They feel their pain is not under control without the use of pain medications.    Review of Systems:  Review of Systems   Constitutional: Negative.    HENT:  Negative.     Eyes: Negative.    Respiratory:  Positive for shortness of breath.    Cardiovascular: Negative.    Gastrointestinal: Negative.    Endocrine: Negative.    Genitourinary: Negative.     Musculoskeletal: Negative.    Skin: Negative.    Neurological: Negative.    Hematological: Negative.    Psychiatric/Behavioral: Negative.

## 2025-03-03 ENCOUNTER — HOSPITAL ENCOUNTER (OUTPATIENT)
Dept: RADIOLOGY | Facility: CLINIC | Age: OVER 89
Discharge: HOME | End: 2025-03-03
Payer: MEDICARE

## 2025-03-03 DIAGNOSIS — R91.1 NODULE OF LOWER LOBE OF LEFT LUNG: ICD-10-CM

## 2025-03-03 DIAGNOSIS — C34.92 PRIMARY ADENOCARCINOMA OF LEFT LUNG (MULTI): ICD-10-CM

## 2025-03-03 PROCEDURE — 3430000001 HC RX 343 DIAGNOSTIC RADIOPHARMACEUTICALS: Performed by: STUDENT IN AN ORGANIZED HEALTH CARE EDUCATION/TRAINING PROGRAM

## 2025-03-03 PROCEDURE — 78815 PET IMAGE W/CT SKULL-THIGH: CPT | Mod: PS

## 2025-03-03 PROCEDURE — A9552 F18 FDG: HCPCS | Performed by: STUDENT IN AN ORGANIZED HEALTH CARE EDUCATION/TRAINING PROGRAM

## 2025-03-03 PROCEDURE — 78815 PET IMAGE W/CT SKULL-THIGH: CPT | Mod: PET TUMOR SUBSQ TX STRATEGY | Performed by: RADIOLOGY

## 2025-03-03 RX ORDER — FLUDEOXYGLUCOSE F 18 200 MCI/ML
11.6 INJECTION, SOLUTION INTRAVENOUS
Status: COMPLETED | OUTPATIENT
Start: 2025-03-03 | End: 2025-03-03

## 2025-03-03 RX ADMIN — FLUDEOXYGLUCOSE F 18 11.6 MILLICURIE: 200 INJECTION, SOLUTION INTRAVENOUS at 10:26

## 2025-03-11 DIAGNOSIS — I10 BENIGN ESSENTIAL HTN: ICD-10-CM

## 2025-03-11 RX ORDER — PROPRANOLOL HYDROCHLORIDE 20 MG/1
20 TABLET ORAL 3 TIMES DAILY
Qty: 270 TABLET | Refills: 3 | Status: SHIPPED | OUTPATIENT
Start: 2025-03-11

## 2025-03-28 ENCOUNTER — HOSPITAL ENCOUNTER (OUTPATIENT)
Dept: RADIOLOGY | Facility: HOSPITAL | Age: OVER 89
Discharge: HOME | End: 2025-03-28
Payer: MEDICARE

## 2025-03-28 VITALS
WEIGHT: 143 LBS | BODY MASS INDEX: 26.16 KG/M2 | RESPIRATION RATE: 14 BRPM | OXYGEN SATURATION: 99 % | DIASTOLIC BLOOD PRESSURE: 78 MMHG | TEMPERATURE: 98.6 F | HEART RATE: 61 BPM | SYSTOLIC BLOOD PRESSURE: 172 MMHG

## 2025-03-28 DIAGNOSIS — C34.92 PRIMARY ADENOCARCINOMA OF LEFT LUNG (MULTI): ICD-10-CM

## 2025-03-28 DIAGNOSIS — R91.1 NODULE OF LOWER LOBE OF LEFT LUNG: ICD-10-CM

## 2025-03-28 LAB
INR PPP: 1.1 (ref 0.9–1.1)
PLATELET # BLD AUTO: 189 X10*3/UL (ref 150–450)
PROTHROMBIN TIME: 11.6 SECONDS (ref 9.8–12.4)

## 2025-03-28 PROCEDURE — 2500000004 HC RX 250 GENERAL PHARMACY W/ HCPCS (ALT 636 FOR OP/ED): Performed by: RADIOLOGY

## 2025-03-28 PROCEDURE — 2500000005 HC RX 250 GENERAL PHARMACY W/O HCPCS: Performed by: RADIOLOGY

## 2025-03-28 PROCEDURE — 99152 MOD SED SAME PHYS/QHP 5/>YRS: CPT

## 2025-03-28 PROCEDURE — 99153 MOD SED SAME PHYS/QHP EA: CPT | Performed by: RADIOLOGY

## 2025-03-28 PROCEDURE — 85049 AUTOMATED PLATELET COUNT: CPT | Performed by: RADIOLOGY

## 2025-03-28 PROCEDURE — 99153 MOD SED SAME PHYS/QHP EA: CPT

## 2025-03-28 PROCEDURE — 71045 X-RAY EXAM CHEST 1 VIEW: CPT

## 2025-03-28 PROCEDURE — 99152 MOD SED SAME PHYS/QHP 5/>YRS: CPT | Performed by: RADIOLOGY

## 2025-03-28 PROCEDURE — 7100000009 HC PHASE TWO TIME - INITIAL BASE CHARGE

## 2025-03-28 PROCEDURE — 32408 CORE NDL BX LNG/MED PERQ: CPT

## 2025-03-28 PROCEDURE — 2720000007 HC OR 272 NO HCPCS

## 2025-03-28 PROCEDURE — 7100000010 HC PHASE TWO TIME - EACH INCREMENTAL 1 MINUTE

## 2025-03-28 PROCEDURE — 85610 PROTHROMBIN TIME: CPT | Performed by: RADIOLOGY

## 2025-03-28 PROCEDURE — 36415 COLL VENOUS BLD VENIPUNCTURE: CPT | Performed by: RADIOLOGY

## 2025-03-28 RX ORDER — LIDOCAINE HYDROCHLORIDE 10 MG/ML
INJECTION, SOLUTION EPIDURAL; INFILTRATION; INTRACAUDAL; PERINEURAL
Status: COMPLETED | OUTPATIENT
Start: 2025-03-28 | End: 2025-03-28

## 2025-03-28 RX ORDER — MIDAZOLAM HYDROCHLORIDE 1 MG/ML
INJECTION, SOLUTION INTRAMUSCULAR; INTRAVENOUS
Status: COMPLETED | OUTPATIENT
Start: 2025-03-28 | End: 2025-03-28

## 2025-03-28 RX ADMIN — MIDAZOLAM 1 MG: 1 INJECTION INTRAMUSCULAR; INTRAVENOUS at 08:18

## 2025-03-28 RX ADMIN — Medication 3 L/MIN: at 08:14

## 2025-03-28 RX ADMIN — LIDOCAINE HYDROCHLORIDE 10 ML: 10 INJECTION, SOLUTION EPIDURAL; INFILTRATION; INTRACAUDAL; PERINEURAL at 08:28

## 2025-03-28 ASSESSMENT — PAIN SCALES - GENERAL

## 2025-03-28 ASSESSMENT — PAIN - FUNCTIONAL ASSESSMENT
PAIN_FUNCTIONAL_ASSESSMENT: 0-10

## 2025-03-28 NOTE — NURSING NOTE
Patient post lung biopsy; in recovery, patient did well with no complaints. Dressing has been dry and intact with no ooze or hematoma. X-ray was completed and per dr. Dennis, patient ok for discharge. Discharge instructions complete and patient verbalized understanding. Iv removed. Patient wheeled down to transport via wheelchair with rn.

## 2025-03-28 NOTE — PRE-PROCEDURE NOTE
Interventional Radiology Preprocedure Note    Indication for procedure: Diagnoses of Primary adenocarcinoma of left lung (Multi) and Nodule of lower lobe of left lung were pertinent to this visit.    Relevant review of systems: NA    Relevant Labs:   Lab Results   Component Value Date    INR 1.1 03/28/2025    PROTIME 11.6 03/28/2025       Planned Sedation/Anesthesia: Moderate    Airway assessment: normal    Directed physical examination:    Aox3  No increased work of breathing.   No acute distress      Mallampati: II (hard and soft palate, upper portion of tonsils and uvula visible)    ASA Score: ASA 2 - Patient with mild systemic disease with no functional limitations    Benefits, risks and alternatives of procedure and planned sedation have been discussed with the patient and/or their representative. All questions answered and they agree to proceed.

## 2025-03-28 NOTE — Clinical Note
Core sample left lung taken at this time placed in formulin . Biocentry placed. Biopsy catheter removed bandaide placed no bleeding noted

## 2025-03-28 NOTE — PROCEDURES
Interventional Radiology Brief Postprocedure Note    Attending: Inocencia Dennis MD    Assistant:   Staff Role   Amy Salazar, RN Radiology Nurse   Noam Montoya Radiology Technologist   Inocnecia Dennis MD Radiologist       Diagnosis:   1. Primary adenocarcinoma of left lung (Multi)  CT guided percutaneous biopsy lung    CT guided percutaneous biopsy lung    Surgical Pathology Exam    Surgical Pathology Exam      2. Nodule of lower lobe of left lung  CT guided percutaneous biopsy lung    CT guided percutaneous biopsy lung    Surgical Pathology Exam    Surgical Pathology Exam          Description of procedure: CT guided percutaneous biopsy lung 20 G x 1    Timeout:  Yes    Procedure Area: Procedure Area     Anesthesia:   Conscious Sedation    Complications: None    Estimated Blood Loss: minimal    Medications (Filter: Administrations occurring from 0809 to 0841 on 03/28/25) As of 03/28/25 0841      oxygen (O2) therapy (L/min) Total volume:  Not documented*   *Total volume has not been documented. View each administration to see the amount administered.     Date/Time Rate/Dose/Volume Action       03/28/25  0814 3 L/min - 180,000 mL/hr Start               midazolam (Versed) injection (mg) Total dose:  1 mg      Date/Time Rate/Dose/Volume Action       03/28/25  0818 1 mg Given               lidocaine PF (Xylocaine) 10 mg/mL (1 %) injection (mL) Total volume:  10 mL      Date/Time Rate/Dose/Volume Action       03/28/25  0828 10 mL Given                   ID Type Source Tests Collected by Time   1 : left lower lung biopsy core Tissue LUNG BIOPSY LEFT (SPECIFY SITE) SURGICAL PATHOLOGY EXAM Inocencia Dennis MD 3/28/2025 0826         See detailed result report with images in PACS.    The patient tolerated the procedure well without incident or complication and is in stable condition.

## 2025-04-01 PROBLEM — C34.92 PRIMARY ADENOCARCINOMA OF LEFT LUNG (MULTI): Status: RESOLVED | Noted: 2024-01-23 | Resolved: 2025-04-01

## 2025-04-01 PROBLEM — C34.32: Status: ACTIVE | Noted: 2025-04-01

## 2025-04-01 PROBLEM — C34.12: Status: ACTIVE | Noted: 2025-04-01

## 2025-04-01 LAB
LAB AP ASR DISCLAIMER: NORMAL
LABORATORY COMMENT REPORT: NORMAL
PATH REPORT.COMMENTS IMP SPEC: NORMAL
PATH REPORT.FINAL DX SPEC: NORMAL
PATH REPORT.GROSS SPEC: NORMAL
PATH REPORT.RELEVANT HX SPEC: NORMAL
PATH REPORT.TOTAL CANCER: NORMAL

## 2025-04-08 ENCOUNTER — HOSPITAL ENCOUNTER (OUTPATIENT)
Dept: RADIATION ONCOLOGY | Facility: CLINIC | Age: OVER 89
Setting detail: RADIATION/ONCOLOGY SERIES
Discharge: HOME | End: 2025-04-08
Payer: MEDICARE

## 2025-04-08 VITALS
BODY MASS INDEX: 27.18 KG/M2 | HEART RATE: 69 BPM | OXYGEN SATURATION: 92 % | DIASTOLIC BLOOD PRESSURE: 72 MMHG | WEIGHT: 148.6 LBS | RESPIRATION RATE: 18 BRPM | SYSTOLIC BLOOD PRESSURE: 163 MMHG | TEMPERATURE: 97.3 F

## 2025-04-08 DIAGNOSIS — C34.12 ADENOCARCINOMA OF UPPER LOBE OF LEFT LUNG (MULTI): ICD-10-CM

## 2025-04-08 DIAGNOSIS — C34.32 ADENOCARCINOMA OF LOWER LOBE OF LEFT LUNG (MULTI): Primary | ICD-10-CM

## 2025-04-08 PROCEDURE — 99215 OFFICE O/P EST HI 40 MIN: CPT | Performed by: STUDENT IN AN ORGANIZED HEALTH CARE EDUCATION/TRAINING PROGRAM

## 2025-04-08 PROCEDURE — G2211 COMPLEX E/M VISIT ADD ON: HCPCS | Performed by: STUDENT IN AN ORGANIZED HEALTH CARE EDUCATION/TRAINING PROGRAM

## 2025-04-08 ASSESSMENT — COLUMBIA-SUICIDE SEVERITY RATING SCALE - C-SSRS
6. HAVE YOU EVER DONE ANYTHING, STARTED TO DO ANYTHING, OR PREPARED TO DO ANYTHING TO END YOUR LIFE?: NO
1. IN THE PAST MONTH, HAVE YOU WISHED YOU WERE DEAD OR WISHED YOU COULD GO TO SLEEP AND NOT WAKE UP?: NO
2. HAVE YOU ACTUALLY HAD ANY THOUGHTS OF KILLING YOURSELF?: NO

## 2025-04-08 ASSESSMENT — ENCOUNTER SYMPTOMS
ENDOCRINE NEGATIVE: 1
NEUROLOGICAL NEGATIVE: 1
ARTHRALGIAS: 1
CARDIOVASCULAR NEGATIVE: 1
COUGH: 1
CONSTITUTIONAL NEGATIVE: 1
SHORTNESS OF BREATH: 1
GASTROINTESTINAL NEGATIVE: 1
HEMATOLOGIC/LYMPHATIC NEGATIVE: 1
EYES NEGATIVE: 1
PSYCHIATRIC NEGATIVE: 1

## 2025-04-08 ASSESSMENT — PATIENT HEALTH QUESTIONNAIRE - PHQ9
2. FEELING DOWN, DEPRESSED OR HOPELESS: NOT AT ALL
1. LITTLE INTEREST OR PLEASURE IN DOING THINGS: NOT AT ALL
SUM OF ALL RESPONSES TO PHQ9 QUESTIONS 1 AND 2: 0

## 2025-04-08 ASSESSMENT — PAIN SCALES - GENERAL: PAINLEVEL_OUTOF10: 0-NO PAIN

## 2025-04-08 NOTE — PROGRESS NOTES
Radiation Oncology Follow-Up    Patient Name:  Roro Pulido  MRN:  [id number]  :  12/10/1934    Primary Care Provider: Ann Brady DO  Care Team: Patient Care Team:  Ann Brady DO as PCP - General (Internal Medicine)  Stephanie Burger MD as Obstetrician (Obstetrics and Gynecology)    Date of Service: 2025     SUBJECTIVE  History of Present Illness:   Roro Pulido is a 89 y.o. female with adenocarcinoma of the left upper lobe lung, [id number] Stage 1A3. S/p SBRT 54 Gy in 3 fx completed 2024. Subsequently a left lower lobe nodule increased in size and showed focal hypermetabolic activity, and CT-guided biopsy confirmed adenocarcinoma, [id number].   She returns today for follow-up to discuss PET/CT and biopsy results.  She states she tolerated the biopsy without issue.  Denies new or worsening shortness of breath.         Treatment Rendered:   SBRT: Left Upper lobe of lung (Resolved)    Treatment Period Technique Fraction Dose Fractions Total Dose   Course 1 2024-2024  (days elapsed: 5)         BUCK lung 2024-2024 SBRT 1800 / 1,800 cGy 3 / 3 5400 / 5,400 cGy       Review of Systems:   Review of Systems - Oncology  - please see nursing note    Performance Status:   The Karnofsky performance scale today is 80, Normal activity with effort; some signs or symptoms of disease (ECOG equivalent 1).      OBJECTIVE  /72   Pulse 69   Temp 36.3 °C (97.3 °F)   Resp 18   Wt 67.4 kg (148 lb 9.6 oz)   SpO2 92%   BMI 27.18 kg/m²    Physical Exam  Vitals reviewed.   Constitutional:       General: She is not in acute distress.  HENT:      Head: Normocephalic and atraumatic.   Pulmonary:      Effort: Pulmonary effort is normal. No respiratory distress.   Abdominal:      General: There is no distension.   Skin:     Findings: No rash.   Neurological:      Mental Status: She is alert and oriented to person, place, and time.   Psychiatric:         Mood and Affect: Mood normal.         Behavior:  Behavior normal.           ASSESSMENT:  Roro Pulido is a 89 y.o. 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 S/p SBRT 54 Gy in 3 fx completed 2/2024. Subsequently a left lower lobe nodule increased in size and showed focal hypermetabolic activity, and CT-guided biopsy confirmed adenocarcinoma, [id number].     PLAN:   Imaging and biopsy reviewed.   She has a left lower lobe malignancy that is the same histologic type as her previously treated left upper lobe adenocarcinoma.  We discussed that this could represent metachronous spread to a new lobe, though more likely represents a separate primary malignancy.  I recommend SBRT for local control. We discussed the logistics of radiation planning including CT simulation.   Acute side effects of treatment include but are not limited to fatigue, focal skin reaction, cough, shortness of breath, pneumonitis, chest wall pain.  Long-term risks of treatment include but are not limited to fibrosis of the lung, chest wall toxicity, damage to other organs of the thorax including the proximal bronchial tree, heart, esophagus, blood vessels, spinal cord, rare risk of secondary malignancy.   She stated her understanding and wishes to proceed.  Informed consent was signed.  CT simulation will be scheduled.    NCCN Guidelines were applicable to guide this patients treatment plan. Effort is required for continued longitudinal patient care.    Thank you for allowing me to participate in this patient's care.    Ryan Newton MD  Department of Radiation Oncology  [facility]    Portions of this note were generated using voice recognition software, and may be subject to transcription errors.

## 2025-04-08 NOTE — PROGRESS NOTES
Radiation Oncology Nursing Note    Pain: The patient's current pain level was assessed.  They report currently having a pain of 0 out of 10.  They feel their pain is under control without the use of pain medications.    Review of Systems:  Review of Systems   Constitutional: Negative.    HENT:  Negative.     Eyes: Negative.    Respiratory:  Positive for cough and shortness of breath.    Cardiovascular: Negative.    Gastrointestinal: Negative.    Endocrine: Negative.    Genitourinary: Negative.     Musculoskeletal:  Positive for arthralgias.   Skin: Negative.    Neurological: Negative.    Hematological: Negative.    Psychiatric/Behavioral: Negative.

## 2025-04-11 LAB
ELECTRONICALLY SIGNED BY: NORMAL
FOCUSED SOLID TUMOR DNA/RNA RESULTS: NORMAL

## 2025-04-11 PROCEDURE — G0452 MOLECULAR PATHOLOGY INTERPR: HCPCS | Performed by: STUDENT IN AN ORGANIZED HEALTH CARE EDUCATION/TRAINING PROGRAM

## 2025-04-11 PROCEDURE — 81458 SO GSAP DNA CPY NMBR&MCRSTL: CPT | Performed by: STUDENT IN AN ORGANIZED HEALTH CARE EDUCATION/TRAINING PROGRAM

## 2025-04-14 ENCOUNTER — HOSPITAL ENCOUNTER (OUTPATIENT)
Dept: RADIOLOGY | Facility: EXTERNAL LOCATION | Age: OVER 89
Discharge: HOME | End: 2025-04-14

## 2025-04-14 ENCOUNTER — HOSPITAL ENCOUNTER (OUTPATIENT)
Dept: RADIATION ONCOLOGY | Facility: HOSPITAL | Age: OVER 89
Setting detail: RADIATION/ONCOLOGY SERIES
Discharge: HOME | End: 2025-04-14
Payer: MEDICARE

## 2025-04-14 ENCOUNTER — APPOINTMENT (OUTPATIENT)
Dept: RADIATION ONCOLOGY | Facility: CLINIC | Age: OVER 89
End: 2025-04-14
Payer: MEDICARE

## 2025-04-14 DIAGNOSIS — C34.32 MALIGNANT NEOPLASM OF LOWER LOBE, LEFT BRONCHUS OR LUNG: ICD-10-CM

## 2025-04-14 DIAGNOSIS — C34.32 MALIGNANT NEOPLASM OF LOWER LOBE, LEFT BRONCHUS OR LUNG: Primary | ICD-10-CM

## 2025-04-14 PROCEDURE — 77334 RADIATION TREATMENT AID(S): CPT | Performed by: RADIOLOGY

## 2025-04-14 PROCEDURE — 77290 THER RAD SIMULAJ FIELD CPLX: CPT | Performed by: RADIOLOGY

## 2025-04-17 ENCOUNTER — HOSPITAL ENCOUNTER (OUTPATIENT)
Dept: RADIATION ONCOLOGY | Facility: HOSPITAL | Age: OVER 89
Setting detail: RADIATION/ONCOLOGY SERIES
Discharge: HOME | End: 2025-04-17
Payer: MEDICARE

## 2025-04-17 PROCEDURE — 77295 3-D RADIOTHERAPY PLAN: CPT | Performed by: STUDENT IN AN ORGANIZED HEALTH CARE EDUCATION/TRAINING PROGRAM

## 2025-04-17 PROCEDURE — 77334 RADIATION TREATMENT AID(S): CPT | Performed by: STUDENT IN AN ORGANIZED HEALTH CARE EDUCATION/TRAINING PROGRAM

## 2025-04-17 PROCEDURE — 77300 RADIATION THERAPY DOSE PLAN: CPT | Performed by: STUDENT IN AN ORGANIZED HEALTH CARE EDUCATION/TRAINING PROGRAM

## 2025-04-17 PROCEDURE — 77293 RESPIRATOR MOTION MGMT SIMUL: CPT | Performed by: STUDENT IN AN ORGANIZED HEALTH CARE EDUCATION/TRAINING PROGRAM

## 2025-04-21 LAB
25(OH)D3+25(OH)D2 SERPL-MCNC: 53 NG/ML (ref 30–100)
ALBUMIN SERPL-MCNC: 4 G/DL (ref 3.6–5.1)
ALP SERPL-CCNC: 84 U/L (ref 37–153)
ALT SERPL-CCNC: 13 U/L (ref 6–29)
ANION GAP SERPL CALCULATED.4IONS-SCNC: 11 MMOL/L (CALC) (ref 7–17)
AST SERPL-CCNC: 18 U/L (ref 10–35)
BASOPHILS # BLD AUTO: 32 CELLS/UL (ref 0–200)
BASOPHILS NFR BLD AUTO: 0.6 %
BILIRUB SERPL-MCNC: 0.7 MG/DL (ref 0.2–1.2)
BUN SERPL-MCNC: 17 MG/DL (ref 7–25)
CALCIUM SERPL-MCNC: 10.6 MG/DL (ref 8.6–10.4)
CHLORIDE SERPL-SCNC: 99 MMOL/L (ref 98–110)
CHOLEST SERPL-MCNC: 204 MG/DL
CHOLEST/HDLC SERPL: 4.5 (CALC)
CO2 SERPL-SCNC: 28 MMOL/L (ref 20–32)
CREAT SERPL-MCNC: 0.83 MG/DL (ref 0.6–0.95)
EGFRCR SERPLBLD CKD-EPI 2021: 67 ML/MIN/1.73M2
EOSINOPHIL # BLD AUTO: 81 CELLS/UL (ref 15–500)
EOSINOPHIL NFR BLD AUTO: 1.5 %
ERYTHROCYTE [DISTWIDTH] IN BLOOD BY AUTOMATED COUNT: 12.1 % (ref 11–15)
GLUCOSE SERPL-MCNC: 148 MG/DL (ref 65–99)
HCT VFR BLD AUTO: 35.4 % (ref 35–45)
HDLC SERPL-MCNC: 45 MG/DL
HGB BLD-MCNC: 11.7 G/DL (ref 11.7–15.5)
LDLC SERPL CALC-MCNC: 131 MG/DL (CALC)
LYMPHOCYTES # BLD AUTO: 961 CELLS/UL (ref 850–3900)
LYMPHOCYTES NFR BLD AUTO: 17.8 %
MCH RBC QN AUTO: 31.9 PG (ref 27–33)
MCHC RBC AUTO-ENTMCNC: 33.1 G/DL (ref 32–36)
MCV RBC AUTO: 96.5 FL (ref 80–100)
MONOCYTES # BLD AUTO: 437 CELLS/UL (ref 200–950)
MONOCYTES NFR BLD AUTO: 8.1 %
NEUTROPHILS # BLD AUTO: 3888 CELLS/UL (ref 1500–7800)
NEUTROPHILS NFR BLD AUTO: 72 %
NONHDLC SERPL-MCNC: 159 MG/DL (CALC)
PLATELET # BLD AUTO: 192 THOUSAND/UL (ref 140–400)
PMV BLD REES-ECKER: 10.4 FL (ref 7.5–12.5)
POTASSIUM SERPL-SCNC: 3.3 MMOL/L (ref 3.5–5.3)
PROT SERPL-MCNC: 6.8 G/DL (ref 6.1–8.1)
RBC # BLD AUTO: 3.67 MILLION/UL (ref 3.8–5.1)
SODIUM SERPL-SCNC: 138 MMOL/L (ref 135–146)
TRIGL SERPL-MCNC: 163 MG/DL
TSH SERPL-ACNC: 1.12 MIU/L (ref 0.4–4.5)
WBC # BLD AUTO: 5.4 THOUSAND/UL (ref 3.8–10.8)

## 2025-04-24 PROBLEM — E78.2 MIXED HYPERLIPIDEMIA: Status: ACTIVE | Noted: 2025-04-24

## 2025-04-24 PROBLEM — R73.09 ELEVATED GLUCOSE: Status: ACTIVE | Noted: 2025-04-24

## 2025-04-24 NOTE — PROGRESS NOTES
"Subjective   Patient ID: Roro Pulido is a 89 y.o. female who presents for follow up.    HPI     She is doing well today.    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 S/p SBRT 54 Gy in 3 fx completed 2/2024.   Subsequently a left lower lobe nodule increased in size and showed focal hypermetabolic activity, and CT-guided biopsy confirmed adenocarcinoma.  Recently had CT simulation earlier this month and is planning for SBRT, 4 treatments in May.    Still experiencing swelling in her ankles.  When sleeping they will go down a little bit but remain swollen.    Also states she is experiencing SOB due to lung issues but states it is \"no worse or better than usual.\"    She says she thinks she might have a UTI.   No complaints of burning or itching.  States she is having a \"secretion of some kind\" when she wipes after urination and general discomfort.  Reports it has been doing on for about a month.    Reports that she was fasting for her recent blood tests.  Her last food was a bagel around midnight the night prior to getting them drawn in the morning.    She is still driving herself everywhere aside from going to treatments.     Latest Reference Range & Units 04/21/25 10:36   GLUCOSE 65 - 99 mg/dL 148 (H)   SODIUM 135 - 146 mmol/L 138   POTASSIUM 3.5 - 5.3 mmol/L 3.3 (L)   CHLORIDE 98 - 110 mmol/L 99   CARBON DIOXIDE 20 - 32 mmol/L 28   ELECTROLYTE BALANCE 7 - 17 mmol/L (calc) 11   UREA NITROGEN (BUN) 7 - 25 mg/dL 17   CREATININE 0.60 - 0.95 mg/dL 0.83   EGFR > OR = 60 mL/min/1.73m2 67   CALCIUM 8.6 - 10.4 mg/dL 10.6 (H)   ALBUMIN 3.6 - 5.1 g/dL 4.0   PROTEIN, TOTAL 6.1 - 8.1 g/dL 6.8   ALKALINE PHOSPHATASE 37 - 153 U/L 84   ALT 6 - 29 U/L 13   AST 10 - 35 U/L 18   BILIRUBIN, TOTAL 0.2 - 1.2 mg/dL 0.7   CHOLESTEROL, TOTAL <200 mg/dL 204 (H)   HDL CHOLESTEROL > OR = 50 mg/dL 45 (L)   CHOL/HDLC RATIO <5.0 (calc) 4.5   LDL-CHOLESTEROL mg/dL (calc) 131 (H)   TRIGLYCERIDES " "<150 mg/dL 163 (H)   NON HDL CHOLESTEROL <130 mg/dL (calc) 159 (H)   TSH 0.40 - 4.50 mIU/L 1.12   VITAMIN D,25-OH,TOTAL,IA 30 - 100 ng/mL 53   WHITE BLOOD CELL COUNT 3.8 - 10.8 Thousand/uL 5.4   RED BLOOD CELL COUNT 3.80 - 5.10 Million/uL 3.67 (L)   HEMOGLOBIN 11.7 - 15.5 g/dL 11.7   HEMATOCRIT 35.0 - 45.0 % 35.4   MCV 80.0 - 100.0 fL 96.5   MCH 27.0 - 33.0 pg 31.9   MCHC 32.0 - 36.0 g/dL 33.1   RDW 11.0 - 15.0 % 12.1   PLATELET COUNT 140 - 400 Thousand/uL 192   MPV 7.5 - 12.5 fL 10.4   ABSOLUTE NEUTROPHILS 1,500 - 7,800 cells/uL 3,888   ABSOLUTE LYMPHOCYTES 850 - 3,900 cells/uL 961   ABSOLUTE MONOCYTES 200 - 950 cells/uL 437   ABSOLUTE EOSINOPHILS 15 - 500 cells/uL 81   ABSOLUTE BASOPHILS 0 - 200 cells/uL 32   NEUTROPHILS % 72   LYMPHOCYTES % 17.8   MONOCYTES % 8.1   EOSINOPHILS % 1.5   BASOPHILS % 0.6   (H): Data is abnormally high  (L): Data is abnormally low    Review of Systems   Respiratory:  Positive for shortness of breath (this is normal for her given lung issues).    Cardiovascular:  Positive for leg swelling (ankles). Negative for chest pain and palpitations.   Genitourinary:  Positive for dysuria (general discomfort.) and vaginal discharge.       Current Medications[1]    Objective   /73   Pulse 61   Resp 16   Ht 1.575 m (5' 2\")   Wt 67.5 kg (148 lb 12.8 oz)   BMI 27.22 kg/m²     Physical Exam  Constitutional:       Appearance: Normal appearance.   Eyes:      Conjunctiva/sclera: Conjunctivae normal.      Pupils: Pupils are equal, round, and reactive to light.   Cardiovascular:      Rate and Rhythm: Normal rate and regular rhythm.      Heart sounds: Normal heart sounds.   Pulmonary:      Effort: Pulmonary effort is normal.      Breath sounds: Normal breath sounds.   Lymphadenopathy:      Cervical: No cervical adenopathy.       Assessment/Plan   Problem List Items Addressed This Visit          Cardiac and Vasculature    Benign essential HTN - Primary    Today's BP was elevated on intake.  Remain " on bisoprolol-Hydrochlorothiazide 2.5-6.25 mg daily, propanolol 20 mg TID and terazosin 3 mg daily .         Mixed hyperlipidemia    Lipids elevated.  Total cholesterol 204- - Triglycerides 163  Goal LDL <100         Venous insufficiency    Causing swelling in b/l ankles.  Advised to keep legs elevated as much as possible and consider knee high compression socks.            Endocrine/Metabolic    Elevated glucose    Reports that her last food was a bagel at midnight the night prior to getting her labs done in the morning.  Recent fasting glucose elevated at 148  Repeat labs in 2 weeks and be sure to be fasting for 8-10 hours.         Relevant Orders    Hemoglobin A1C       Gastrointestinal and Abdominal    Gastroesophageal reflux disease with esophagitis without hemorrhage    Remain on Pantoprazole 40 mg daily             Genitourinary and Reproductive    Dysuria    Having vaginal discharge and urinary discomfort for the last month.  Checked urinalysis today in office.  Urinalysis showed trace amounts of blood in her urine but otherwise normal.  No indication for antibiotics or any medication at this time.         Relevant Orders    POCT UA Automated manually resulted (Completed)    Hypokalemia    Low on recent labs at 3.3 potassium.  Will start her on potassium chloride 20 mEq daily for 5 days.   Sent rx to pharmacy today.  Repeat labs in about 2 weeks.         Relevant Medications    potassium chloride CR 10 mEq ER tablet    Other Relevant Orders    Basic metabolic panel       Hematology and Neoplasia    Adenocarcinoma of lower lobe of left lung (Multi)    Recent biopsy confirmed adenocarcinoma.   Following with  rad/onc.  Had CT stimulation done earlier this month and scheduled for SBRT 4 fx next month.          Other Visit Diagnoses         High serum calcium        Relevant Orders    Basic metabolic panel      Encounter for preventative adult health care examination        Relevant Orders    Follow Up  In [facility] - PCP - Medicare Annual            Please return to see me in 6 months for medicare wellness visit.    Scribe Attestation  By signing my name below, I, Horacio John   attest that this documentation has been prepared under the direction and in the presence of Ann Brady DO.         [1]   Current Outpatient Medications:     bisoproloL-hydrochlorothiazide (Ziac) 2.5-6.25 mg tablet, Take 1 tablet by mouth once daily., Disp: 90 tablet, Rfl: 1    calcium carbonate-vitamin D3 (Oyster Shell) 250 mg-3.125 mcg (125 unit) tablet, Take 1 tablet by mouth 2 times daily (morning and late afternoon)., Disp: , Rfl:     hydroCHLOROthiazide (HYDRODiuril) 25 mg tablet, Take 1 tablet (25 mg) by mouth once daily., Disp: 90 tablet, Rfl: 3    pantoprazole (ProtoNix) 40 mg EC tablet, Take 1 tablet (40 mg) by mouth once daily in the morning. Take before meals. Do not crush, chew, or split., Disp: 90 tablet, Rfl: 3    potassium chloride CR 10 mEq ER tablet, Take 2 tablets (20 mEq) by mouth once daily. Do not crush, chew, or split., Disp: 10 tablet, Rfl: 0    propranolol (Inderal) 20 mg tablet, TAKE 1 TABLET BY MOUTH 3 TIMES  DAILY, Disp: 270 tablet, Rfl: 3    terazosin (Hytrin) 1 mg capsule, Take 3 tablets before bed to equal 3 mg., Disp: 270 capsule, Rfl: 1

## 2025-04-24 NOTE — ASSESSMENT & PLAN NOTE
Today's BP was elevated on intake.  Remain on bisoprolol-Hydrochlorothiazide 2.5-6.25 mg daily, propanolol 20 mg TID and terazosin 3 mg daily .

## 2025-04-24 NOTE — ASSESSMENT & PLAN NOTE
Reports that her last food was a bagel at midnight the night prior to getting her labs done in the morning.  Recent fasting glucose elevated at 148  Repeat labs in 2 weeks and be sure to be fasting for 8-10 hours.

## 2025-04-25 ENCOUNTER — APPOINTMENT (OUTPATIENT)
Dept: PRIMARY CARE | Facility: CLINIC | Age: OVER 89
End: 2025-04-25
Payer: MEDICARE

## 2025-04-25 VITALS
BODY MASS INDEX: 27.38 KG/M2 | WEIGHT: 148.8 LBS | SYSTOLIC BLOOD PRESSURE: 162 MMHG | DIASTOLIC BLOOD PRESSURE: 73 MMHG | HEIGHT: 62 IN | HEART RATE: 61 BPM | RESPIRATION RATE: 16 BRPM

## 2025-04-25 DIAGNOSIS — R79.89 HIGH SERUM CALCIUM: ICD-10-CM

## 2025-04-25 DIAGNOSIS — K21.00 GASTROESOPHAGEAL REFLUX DISEASE WITH ESOPHAGITIS WITHOUT HEMORRHAGE: ICD-10-CM

## 2025-04-25 DIAGNOSIS — I87.2 VENOUS INSUFFICIENCY: ICD-10-CM

## 2025-04-25 DIAGNOSIS — E78.2 MIXED HYPERLIPIDEMIA: ICD-10-CM

## 2025-04-25 DIAGNOSIS — C34.32 ADENOCARCINOMA OF LOWER LOBE OF LEFT LUNG (MULTI): ICD-10-CM

## 2025-04-25 DIAGNOSIS — Z00.00 ENCOUNTER FOR PREVENTATIVE ADULT HEALTH CARE EXAMINATION: ICD-10-CM

## 2025-04-25 DIAGNOSIS — I10 BENIGN ESSENTIAL HTN: Primary | ICD-10-CM

## 2025-04-25 DIAGNOSIS — E83.52 HYPERCALCEMIA: ICD-10-CM

## 2025-04-25 DIAGNOSIS — R30.0 DYSURIA: ICD-10-CM

## 2025-04-25 DIAGNOSIS — R73.09 ELEVATED GLUCOSE: ICD-10-CM

## 2025-04-25 DIAGNOSIS — E87.6 HYPOKALEMIA: ICD-10-CM

## 2025-04-25 PROBLEM — M79.89 LEG SWELLING: Status: ACTIVE | Noted: 2025-04-25

## 2025-04-25 LAB
POC APPEARANCE, URINE: CLEAR
POC BILIRUBIN, URINE: NEGATIVE
POC BLOOD, URINE: ABNORMAL
POC COLOR, URINE: YELLOW
POC GLUCOSE, URINE: NEGATIVE MG/DL
POC KETONES, URINE: NEGATIVE MG/DL
POC LEUKOCYTES, URINE: NEGATIVE
POC NITRITE,URINE: NEGATIVE
POC PH, URINE: 5.5 PH
POC PROTEIN, URINE: NEGATIVE MG/DL
POC SPECIFIC GRAVITY, URINE: 1.02
POC UROBILINOGEN, URINE: 0.2 EU/DL

## 2025-04-25 RX ORDER — POTASSIUM CHLORIDE 750 MG/1
20 TABLET, FILM COATED, EXTENDED RELEASE ORAL DAILY
Qty: 10 TABLET | Refills: 0 | Status: SHIPPED | OUTPATIENT
Start: 2025-04-25

## 2025-04-25 ASSESSMENT — PAIN SCALES - GENERAL: PAINLEVEL_OUTOF10: 0-NO PAIN

## 2025-04-25 ASSESSMENT — ENCOUNTER SYMPTOMS
PALPITATIONS: 0
SHORTNESS OF BREATH: 1
DYSURIA: 1

## 2025-04-25 NOTE — PATIENT INSTRUCTIONS
I sent potassium to [place], take two tablets once a day for five days in a row.     You urine test did not show any sign of infection.     Stop taking calcium tablets for two weeks since your calcium was a bit elevated as well.     Get blood test at lab in about two weeks after fasting  to check on blood sugar and to recheck potassium and calcium.    I will let you know if you should restart your calcium after the blood test.     See me again in 6 months for Medicare wellness , or sooner if needed.     We will contact you closer to next visit to let you know when to get blood tests again.

## 2025-04-25 NOTE — ASSESSMENT & PLAN NOTE
Recent biopsy confirmed adenocarcinoma.   Following with  rad/onc.  Had CT stimulation done earlier this month and scheduled for SBRT 4 fx next month.

## 2025-04-25 NOTE — ASSESSMENT & PLAN NOTE
Having vaginal discharge and urinary discomfort for the last month.  Checked urinalysis today in office.  Urinalysis showed trace amounts of blood in her urine but otherwise normal.  No indication for antibiotics or any medication at this time.

## 2025-04-25 NOTE — ASSESSMENT & PLAN NOTE
Low on recent labs at 3.3 potassium.  Will start her on potassium chloride 20 mEq daily for 5 days.   Sent rx to pharmacy today.  Repeat labs in about 2 weeks.

## 2025-04-25 NOTE — ASSESSMENT & PLAN NOTE
Causing swelling in b/l ankles.  Advised to keep legs elevated as much as possible and consider knee high compression socks.

## 2025-05-09 ENCOUNTER — HOSPITAL ENCOUNTER (OUTPATIENT)
Dept: RADIATION ONCOLOGY | Facility: CLINIC | Age: OVER 89
Setting detail: RADIATION/ONCOLOGY SERIES
Discharge: HOME | End: 2025-05-09
Payer: MEDICARE

## 2025-05-09 DIAGNOSIS — Z51.0 ENCOUNTER FOR ANTINEOPLASTIC RADIATION THERAPY: ICD-10-CM

## 2025-05-09 DIAGNOSIS — C34.12 MALIGNANT NEOPLASM OF UPPER LOBE, LEFT BRONCHUS OR LUNG (MULTI): ICD-10-CM

## 2025-05-09 DIAGNOSIS — C34.32 MALIGNANT NEOPLASM OF LOWER LOBE, LEFT BRONCHUS OR LUNG: ICD-10-CM

## 2025-05-09 LAB
RAD ONC MSQ ACTUAL FRACTIONS DELIVERED: 1
RAD ONC MSQ ACTUAL SESSION DELIVERED DOSE: 1250 CGRAY
RAD ONC MSQ ACTUAL TOTAL DOSE: 1250 CGRAY
RAD ONC MSQ ELAPSED DAYS: 0
RAD ONC MSQ LAST DATE: NORMAL
RAD ONC MSQ PRESCRIBED FRACTIONAL DOSE: 1250 CGRAY
RAD ONC MSQ PRESCRIBED NUMBER OF FRACTIONS: 4
RAD ONC MSQ PRESCRIBED TECHNIQUE: NORMAL
RAD ONC MSQ PRESCRIBED TOTAL DOSE: 5000 CGRAY
RAD ONC MSQ PRESCRIPTION PATTERN COMMENT: NORMAL
RAD ONC MSQ START DATE: NORMAL
RAD ONC MSQ TREATMENT COURSE NUMBER: 2
RAD ONC MSQ TREATMENT SITE: NORMAL

## 2025-05-09 PROCEDURE — 77280 THER RAD SIMULAJ FIELD SMPL: CPT

## 2025-05-09 PROCEDURE — 77373 STRTCTC BDY RAD THER TX DLVR: CPT

## 2025-05-12 ENCOUNTER — HOSPITAL ENCOUNTER (OUTPATIENT)
Dept: RADIATION ONCOLOGY | Facility: CLINIC | Age: OVER 89
Setting detail: RADIATION/ONCOLOGY SERIES
Discharge: HOME | End: 2025-05-12
Payer: MEDICARE

## 2025-05-12 DIAGNOSIS — C34.32 MALIGNANT NEOPLASM OF LOWER LOBE, LEFT BRONCHUS OR LUNG: ICD-10-CM

## 2025-05-12 DIAGNOSIS — C34.12 MALIGNANT NEOPLASM OF UPPER LOBE, LEFT BRONCHUS OR LUNG (MULTI): ICD-10-CM

## 2025-05-12 DIAGNOSIS — Z51.0 ENCOUNTER FOR ANTINEOPLASTIC RADIATION THERAPY: ICD-10-CM

## 2025-05-12 LAB
RAD ONC MSQ ACTUAL FRACTIONS DELIVERED: 2
RAD ONC MSQ ACTUAL SESSION DELIVERED DOSE: 1250 CGRAY
RAD ONC MSQ ACTUAL TOTAL DOSE: 2500 CGRAY
RAD ONC MSQ ELAPSED DAYS: 3
RAD ONC MSQ LAST DATE: NORMAL
RAD ONC MSQ PRESCRIBED FRACTIONAL DOSE: 1250 CGRAY
RAD ONC MSQ PRESCRIBED NUMBER OF FRACTIONS: 4
RAD ONC MSQ PRESCRIBED TECHNIQUE: NORMAL
RAD ONC MSQ PRESCRIBED TOTAL DOSE: 5000 CGRAY
RAD ONC MSQ PRESCRIPTION PATTERN COMMENT: NORMAL
RAD ONC MSQ START DATE: NORMAL
RAD ONC MSQ TREATMENT COURSE NUMBER: 2
RAD ONC MSQ TREATMENT SITE: NORMAL

## 2025-05-12 PROCEDURE — 77373 STRTCTC BDY RAD THER TX DLVR: CPT

## 2025-05-12 PROCEDURE — 77336 RADIATION PHYSICS CONSULT: CPT

## 2025-05-14 ENCOUNTER — HOSPITAL ENCOUNTER (OUTPATIENT)
Dept: RADIATION ONCOLOGY | Facility: CLINIC | Age: OVER 89
Setting detail: RADIATION/ONCOLOGY SERIES
Discharge: HOME | End: 2025-05-14
Payer: MEDICARE

## 2025-05-14 DIAGNOSIS — Z51.0 ENCOUNTER FOR ANTINEOPLASTIC RADIATION THERAPY: ICD-10-CM

## 2025-05-14 DIAGNOSIS — C34.32 MALIGNANT NEOPLASM OF LOWER LOBE, LEFT BRONCHUS OR LUNG: ICD-10-CM

## 2025-05-14 DIAGNOSIS — C34.12 MALIGNANT NEOPLASM OF UPPER LOBE, LEFT BRONCHUS OR LUNG (MULTI): ICD-10-CM

## 2025-05-14 LAB
RAD ONC MSQ ACTUAL FRACTIONS DELIVERED: 3
RAD ONC MSQ ACTUAL SESSION DELIVERED DOSE: 1250 CGRAY
RAD ONC MSQ ACTUAL TOTAL DOSE: 3750 CGRAY
RAD ONC MSQ ELAPSED DAYS: 5
RAD ONC MSQ LAST DATE: NORMAL
RAD ONC MSQ PRESCRIBED FRACTIONAL DOSE: 1250 CGRAY
RAD ONC MSQ PRESCRIBED NUMBER OF FRACTIONS: 4
RAD ONC MSQ PRESCRIBED TECHNIQUE: NORMAL
RAD ONC MSQ PRESCRIBED TOTAL DOSE: 5000 CGRAY
RAD ONC MSQ PRESCRIPTION PATTERN COMMENT: NORMAL
RAD ONC MSQ START DATE: NORMAL
RAD ONC MSQ TREATMENT COURSE NUMBER: 2
RAD ONC MSQ TREATMENT SITE: NORMAL

## 2025-05-14 PROCEDURE — 77373 STRTCTC BDY RAD THER TX DLVR: CPT

## 2025-05-16 ENCOUNTER — HOSPITAL ENCOUNTER (OUTPATIENT)
Dept: RADIATION ONCOLOGY | Facility: CLINIC | Age: OVER 89
Setting detail: RADIATION/ONCOLOGY SERIES
Discharge: HOME | End: 2025-05-16
Payer: MEDICARE

## 2025-05-16 ENCOUNTER — DOCUMENTATION (OUTPATIENT)
Dept: RADIATION ONCOLOGY | Facility: CLINIC | Age: OVER 89
End: 2025-05-16

## 2025-05-16 VITALS
DIASTOLIC BLOOD PRESSURE: 68 MMHG | TEMPERATURE: 98.1 F | HEART RATE: 67 BPM | OXYGEN SATURATION: 92 % | RESPIRATION RATE: 18 BRPM | WEIGHT: 147.6 LBS | SYSTOLIC BLOOD PRESSURE: 128 MMHG | BODY MASS INDEX: 27 KG/M2

## 2025-05-16 DIAGNOSIS — C34.12 MALIGNANT NEOPLASM OF UPPER LOBE, LEFT BRONCHUS OR LUNG (MULTI): ICD-10-CM

## 2025-05-16 DIAGNOSIS — C34.12 ADENOCARCINOMA OF UPPER LOBE OF LEFT LUNG (MULTI): ICD-10-CM

## 2025-05-16 DIAGNOSIS — Z51.0 ENCOUNTER FOR ANTINEOPLASTIC RADIATION THERAPY: ICD-10-CM

## 2025-05-16 DIAGNOSIS — C34.32 ADENOCARCINOMA OF LOWER LOBE OF LEFT LUNG (MULTI): Primary | ICD-10-CM

## 2025-05-16 DIAGNOSIS — C34.32 MALIGNANT NEOPLASM OF LOWER LOBE, LEFT BRONCHUS OR LUNG: ICD-10-CM

## 2025-05-16 LAB
RAD ONC MSQ ACTUAL FRACTIONS DELIVERED: 4
RAD ONC MSQ ACTUAL SESSION DELIVERED DOSE: 1250 CGRAY
RAD ONC MSQ ACTUAL TOTAL DOSE: 5000 CGRAY
RAD ONC MSQ ELAPSED DAYS: 7
RAD ONC MSQ LAST DATE: NORMAL
RAD ONC MSQ PRESCRIBED FRACTIONAL DOSE: 1250 CGRAY
RAD ONC MSQ PRESCRIBED NUMBER OF FRACTIONS: 4
RAD ONC MSQ PRESCRIBED TECHNIQUE: NORMAL
RAD ONC MSQ PRESCRIBED TOTAL DOSE: 5000 CGRAY
RAD ONC MSQ PRESCRIPTION PATTERN COMMENT: NORMAL
RAD ONC MSQ START DATE: NORMAL
RAD ONC MSQ TREATMENT COURSE NUMBER: 2
RAD ONC MSQ TREATMENT SITE: NORMAL

## 2025-05-16 PROCEDURE — 77373 STRTCTC BDY RAD THER TX DLVR: CPT

## 2025-05-16 ASSESSMENT — PAIN SCALES - GENERAL: PAINLEVEL_OUTOF10: 0-NO PAIN

## 2025-05-16 NOTE — PROGRESS NOTES
Radiation Oncology On Treatment Visit    Patient Name:  Roro Pulido  MRN:  [id number]  :  12/10/1934    Referring Provider: No ref. provider found  Primary Care Provider: Ann Brady DO  Care Team: Patient Care Team:  Ann Brady DO as PCP - General (Internal Medicine)  Stephanie Burger MD as Obstetrician (Obstetrics and Gynecology)    Date of Service: 2025     Diagnosis:   Specialty Problems          Radiation Oncology Problems    Adenocarcinoma of lower lobe of left lung (Multi)        Adenocarcinoma of upper lobe of left lung (Multi)         Treatment Summary:  SBRT: Left Upper lobe of lung (Resolved)    Treatment Period Technique Fraction Dose Fractions Total Dose   Course 1 2024-2024  (days elapsed: 5)         BUCK lung 2024-2024 SBRT 1,800 / 1,800 cGy 3 / 3 5,400 / 5,400 cGy      Radiation Therapy    Treatment Period Technique Fraction Dose Fractions Total Dose   Course 2 2025-2025  (days elapsed: 5)         LLL 2025-2025 SBRT 1,250 / 1,250 cGy 3 / 4 3,750 / 5,000 cGy     SUBJECTIVE:   Increased fatigue since starting treatment.  Otherwise feeling well without new complaints.      OBJECTIVE:   Vital Signs:  /68   Pulse 67   Temp 36.7 °C (98.1 °F)   Resp 18   Wt 67 kg (147 lb 9.6 oz)   SpO2 92%   BMI 27.00 kg/m²     Other Pertinent Findings:     Toxicity Assessment          2025    11:20   Toxicity Assessment   Adverse Events Reviewed (WDL) No (Exceptions to WDL)   Treatment Site Thoracic   Fatigue Grade 1        Assessment / Plan:  The patient is tolerating radiation therapy as anticipated.  Continue per current treatment plan. End of treatment today.  3-month follow-up with CT chest for surveillance will be scheduled.     Thank you for allowing me to participate in this patient's care.    Ryan Newton MD  Department of Radiation Oncology  [facility]    Portions of this note were generated using voice recognition software,  and may be subject to transcription errors.

## 2025-05-28 NOTE — PROGRESS NOTES
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 S/p SBRT 54 Gy in 3 fx completed 2024. Subsequently a left lower lobe nodule increased in size and showed focal hypermetabolic activity, and CT-guided biopsy confirmed adenocarcinoma, [id number]. The patient completed stereotractic radiotherapy as outlined below.    Radiation Treatment Summary:    SBRT: Left Upper lobe of lung (Resolved)    Treatment Period Technique Fraction Dose Fractions Total Dose   Course 1 2024-2024  (days elapsed: 5)         BUCK lung 2024-2024 SBRT 1,800 / 1,800 cGy 3 / 3 5,400 / 5,400 cGy      Radiation Therapy    Treatment Period Technique Fraction Dose Fractions Total Dose   Course 2 2025-2025  (days elapsed: 7)         LLL 2025-2025 SBRT 1,250 / 1,250 cGy 4 / 4 5,000 / 5,000 cGy       Please see the patient's [facility] chart for further details regarding the radiation plan, including beam energy.    Concurrent Chemotherapy:  none    CTCAE Toxicity Overview:   Toxicity Assessment          2025    11:20   Toxicity Assessment   Adverse Events Reviewed (WDL) No (Exceptions to WDL)   Treatment Site Thoracic   Fatigue Grade 1     Patient Disposition: Patient to return to clinic in 3 months with CT chest and was instructed to reach out with any questions or concerns in the meantime.    Thank you for allowing me to participate in this patient's care.    Ryan Newton MD  Department of Radiation Oncology  [facility]    Portions of this note were generated using voice recognition software, and may be subject to transcription errors.

## 2025-06-07 LAB
ANION GAP SERPL CALCULATED.4IONS-SCNC: 11 MMOL/L (CALC) (ref 7–17)
BUN SERPL-MCNC: 18 MG/DL (ref 7–25)
BUN/CREAT SERPL: 19 (CALC) (ref 6–22)
CALCIUM SERPL-MCNC: 10.9 MG/DL (ref 8.6–10.4)
CHLORIDE SERPL-SCNC: 99 MMOL/L (ref 98–110)
CO2 SERPL-SCNC: 28 MMOL/L (ref 20–32)
CREAT SERPL-MCNC: 0.97 MG/DL (ref 0.6–0.95)
EGFRCR SERPLBLD CKD-EPI 2021: 56 ML/MIN/1.73M2
EST. AVERAGE GLUCOSE BLD GHB EST-MCNC: 114 MG/DL
EST. AVERAGE GLUCOSE BLD GHB EST-SCNC: 6.3 MMOL/L
GLUCOSE SERPL-MCNC: 86 MG/DL (ref 65–99)
HBA1C MFR BLD: 5.6 %
POTASSIUM SERPL-SCNC: 3.6 MMOL/L (ref 3.5–5.3)
SODIUM SERPL-SCNC: 138 MMOL/L (ref 135–146)

## 2025-07-09 ENCOUNTER — OFFICE VISIT (OUTPATIENT)
Dept: URGENT CARE | Age: OVER 89
End: 2025-07-09
Payer: MEDICARE

## 2025-07-09 VITALS
OXYGEN SATURATION: 97 % | RESPIRATION RATE: 17 BRPM | HEART RATE: 69 BPM | WEIGHT: 147 LBS | SYSTOLIC BLOOD PRESSURE: 150 MMHG | BODY MASS INDEX: 26.89 KG/M2 | DIASTOLIC BLOOD PRESSURE: 78 MMHG

## 2025-07-09 DIAGNOSIS — R22.0 SWELLING OF BOTH LIPS: Primary | ICD-10-CM

## 2025-07-09 DIAGNOSIS — R22.0 CHEEK SWELLING: ICD-10-CM

## 2025-07-09 RX ORDER — METHYLPREDNISOLONE SODIUM SUCCINATE 125 MG/2ML
125 INJECTION INTRAMUSCULAR; INTRAVENOUS ONCE
Status: COMPLETED | OUTPATIENT
Start: 2025-07-09 | End: 2025-07-09

## 2025-07-09 RX ORDER — METHYLPREDNISOLONE SODIUM SUCCINATE 125 MG/2ML
125 INJECTION INTRAMUSCULAR; INTRAVENOUS ONCE
Status: DISCONTINUED | OUTPATIENT
Start: 2025-07-09 | End: 2025-07-09

## 2025-07-09 RX ORDER — PREDNISONE 20 MG/1
TABLET ORAL
Qty: 18 TABLET | Refills: 0 | Status: SHIPPED | OUTPATIENT
Start: 2025-07-09 | End: 2025-07-24

## 2025-07-09 RX ADMIN — METHYLPREDNISOLONE SODIUM SUCCINATE 125 MG: 125 INJECTION INTRAMUSCULAR; INTRAVENOUS at 09:57

## 2025-07-09 ASSESSMENT — ENCOUNTER SYMPTOMS
FATIGUE: 0
FACIAL SWELLING: 1
EYES NEGATIVE: 1
ACTIVITY CHANGE: 0
DIAPHORESIS: 0
APPETITE CHANGE: 0
RESPIRATORY NEGATIVE: 1
SEIZURES: 0
CONSTITUTIONAL NEGATIVE: 1
CHILLS: 0
ALLERGIC REACTION: 1
PSYCHIATRIC NEGATIVE: 1
TROUBLE SWALLOWING: 0
MUSCULOSKELETAL NEGATIVE: 1
FACIAL ASYMMETRY: 0
LIGHT-HEADEDNESS: 0
FEVER: 0
VOICE CHANGE: 0
WEAKNESS: 0
GASTROINTESTINAL NEGATIVE: 1
HEADACHES: 0
SPEECH DIFFICULTY: 0
DIZZINESS: 0
NEUROLOGICAL NEGATIVE: 1
UNEXPECTED WEIGHT CHANGE: 0
SORE THROAT: 0
CARDIOVASCULAR NEGATIVE: 1

## 2025-07-09 NOTE — PROGRESS NOTES
Subjective   Patient ID: Roro Pulido is a 89 y.o. female. They present today with a chief complaint of Allergic Reaction.    History of Present Illness  C/o swollen lips and cheeks s/s x 12 hour(s). She is unsure of any new medication, foods, or exposures. Has tried OTC meds with mild relief. Denies any closing of the throat, swelling of tongue, or trouble breathing, CP, SOB, HA, fever, abdominal pain, and nausea/vomiting otherwise.      History provided by:  Patient  Allergic Reaction  Presenting symptoms: no difficulty swallowing        Past Medical History  Allergies as of 07/09/2025 - Reviewed 07/09/2025   Allergen Reaction Noted    Aspirin Swelling 01/26/2024    Codeine Swelling 01/26/2024    Etm-kvvyrdajj-ql-acetaminophen Other 07/09/2025    Levofloxacin Nausea/vomiting 07/09/2025       Prescriptions Prior to Admission[1]     Medical History[2]    Surgical History[3]     reports that she quit smoking about 41 years ago. Her smoking use included cigarettes. She started smoking about 81 years ago. She has a 20 pack-year smoking history. She has never used smokeless tobacco. She reports current alcohol use of about 2.0 standard drinks of alcohol per week. She reports that she does not use drugs.    Review of Systems  Review of Systems   Constitutional: Negative.  Negative for activity change, appetite change, chills, diaphoresis, fatigue, fever and unexpected weight change.   HENT:  Positive for facial swelling. Negative for dental problem, drooling, mouth sores, sore throat, trouble swallowing and voice change.    Eyes: Negative.    Respiratory: Negative.     Cardiovascular: Negative.    Gastrointestinal: Negative.    Musculoskeletal: Negative.    Skin: Negative.    Neurological: Negative.  Negative for dizziness, seizures, syncope, facial asymmetry, speech difficulty, weakness, light-headedness and headaches.   Psychiatric/Behavioral: Negative.     All other systems reviewed and are negative.                                  Objective    Vitals:    07/09/25 0918   BP: 150/78   Pulse: 69   Resp: 17   SpO2: 97%   Weight: 66.7 kg (147 lb)     No LMP recorded.    Physical Exam  Vitals and nursing note reviewed.   Constitutional:       General: She is not in acute distress.     Appearance: She is not ill-appearing, toxic-appearing or diaphoretic.   HENT:      Head: Normocephalic and atraumatic.      Jaw: Swelling present.      Nose: Nose normal.      Mouth/Throat:      Lips: Pink.      Mouth: Mucous membranes are moist. No injury, lacerations, oral lesions or angioedema.      Tongue: No lesions. Tongue does not deviate from midline.      Pharynx: Oropharynx is clear.   Eyes:      Extraocular Movements: Extraocular movements intact.      Pupils: Pupils are equal, round, and reactive to light.   Cardiovascular:      Rate and Rhythm: Normal rate and regular rhythm.      Pulses: Normal pulses.      Heart sounds: Normal heart sounds.   Pulmonary:      Effort: Pulmonary effort is normal.      Breath sounds: Normal breath sounds.   Abdominal:      General: Abdomen is flat. Bowel sounds are normal.      Palpations: Abdomen is soft.   Skin:     General: Skin is warm and dry.      Findings: No bruising, erythema, lesion or rash.   Neurological:      General: No focal deficit present.      Mental Status: She is alert and oriented to person, place, and time.      Cranial Nerves: No cranial nerve deficit.   Psychiatric:         Mood and Affect: Mood normal.         Behavior: Behavior normal.         Procedures    Point of Care Test & Imaging Results from this visit  No results found for this visit on 07/09/25.   Imaging  No results found.    Cardiology, Vascular, and Other Imaging  No other imaging results found for the past 2 days      Diagnostic study results (if any) were reviewed by ZAFAR Preciado.    Assessment/Plan   Allergies, medications, history, and pertinent labs/EKGs/Imaging reviewed by ZAFAR Preciado.      Medical Decision Making  On exam, swelling to the upper and lower lips. Neg swelling of tongue, or throat. Solumedrol IM. Prednisone taper. Discussed very low threshold for going to the ED for any new or worsening s/s. Discussed pushing fluids, rest, OTC symptoms management PRN.      Discussed potential side effects of oral steroid use were discussed at length with the patient. These risks include but are not limited to: difficulty sleeping/insomnia, increased appetite, fluid retention, mood swings, weight gain, change in blood pressure, high blood glucose, possible adrenal suppression, osteoporosis, avascular necrosis of the hip, menstrual irregularities (if applicable), and cataracts. The patient understands these risks and is willing to proceed with oral steroid therapy.       At time of discharge, patient was clinically well-appearing and appropriate for outpatient management. The patient/parent/guardian was educated regarding diagnosis, supportive care, OTC and Rx medications. The patient/parent/guardian was given the opportunity to ask questions prior to discharge. They verbalized understanding of discussion of treatment plan, expected course of illness and/or injury, indications on when to return to , when to seek further evaluation in ED/call 911, and the need to follow up with PCP and/or specialist as referred. Patient/parent/guardian was provided with work/school documentation if requested. Patient stable upon discharge.      Orders and Diagnoses  Diagnoses and all orders for this visit:  Swelling of both lips  -     predniSONE (Deltasone) 20 mg tablet; Take 2 tablets (40 mg) by mouth once daily for 5 days, THEN 1 tablet (20 mg) once daily for 5 days, THEN 0.5 tablets (10 mg) once daily for 5 days.  -     methylPREDNISolone sodium succinate (PF) (SOLU-Medrol) injection 125 mg  Cheek swelling  -     predniSONE (Deltasone) 20 mg tablet; Take 2 tablets (40 mg) by mouth once daily for 5 days, THEN 1  tablet (20 mg) once daily for 5 days, THEN 0.5 tablets (10 mg) once daily for 5 days.  -     methylPREDNISolone sodium succinate (PF) (SOLU-Medrol) injection 125 mg      Medical Admin Record  Administrations This Visit       methylPREDNISolone sodium succinate (PF) (SOLU-Medrol) injection 125 mg       Admin Date  07/09/2025 Action  Given Dose  125 mg Route  intramuscular Documented By  Yasmeen Carolina MA                    Patient disposition: Home    Electronically signed by ZAFAR Preciado  10:05 AM       [1] (Not in a hospital admission)   [2]   Past Medical History:  Diagnosis Date    GERD (gastroesophageal reflux disease)     HTN (hypertension)     Lung cancer (Multi)     Vitamin D deficiency    [3] History reviewed. No pertinent surgical history.

## 2025-07-13 DIAGNOSIS — I10 BENIGN ESSENTIAL HTN: ICD-10-CM

## 2025-07-14 RX ORDER — BISOPROLOL FUMARATE AND HYDROCHLOROTHIAZIDE 2.5; 6.25 MG/1; MG/1
1 TABLET ORAL DAILY
Qty: 90 TABLET | Refills: 0 | Status: SHIPPED | OUTPATIENT
Start: 2025-07-14

## 2025-07-14 RX ORDER — TERAZOSIN 1 MG/1
CAPSULE ORAL
Qty: 270 CAPSULE | Refills: 3 | Status: SHIPPED | OUTPATIENT
Start: 2025-07-14

## 2025-08-18 ENCOUNTER — HOSPITAL ENCOUNTER (OUTPATIENT)
Dept: RADIOLOGY | Facility: CLINIC | Age: OVER 89
Discharge: HOME | End: 2025-08-18
Payer: MEDICARE

## 2025-08-18 DIAGNOSIS — C34.32 ADENOCARCINOMA OF LOWER LOBE OF LEFT LUNG (MULTI): ICD-10-CM

## 2025-08-18 DIAGNOSIS — C34.12 ADENOCARCINOMA OF UPPER LOBE OF LEFT LUNG (MULTI): ICD-10-CM

## 2025-08-18 PROCEDURE — 71250 CT THORAX DX C-: CPT | Performed by: RADIOLOGY

## 2025-08-18 PROCEDURE — 71250 CT THORAX DX C-: CPT

## 2025-08-20 ENCOUNTER — HOSPITAL ENCOUNTER (OUTPATIENT)
Dept: RADIATION ONCOLOGY | Facility: CLINIC | Age: OVER 89
Setting detail: RADIATION/ONCOLOGY SERIES
Discharge: HOME | End: 2025-08-20
Payer: MEDICARE

## 2025-08-20 VITALS
SYSTOLIC BLOOD PRESSURE: 145 MMHG | HEART RATE: 66 BPM | OXYGEN SATURATION: 91 % | RESPIRATION RATE: 18 BRPM | TEMPERATURE: 97.2 F | BODY MASS INDEX: 26.59 KG/M2 | DIASTOLIC BLOOD PRESSURE: 69 MMHG | WEIGHT: 145.4 LBS

## 2025-08-20 DIAGNOSIS — C34.32 ADENOCARCINOMA OF LOWER LOBE OF LEFT LUNG (MULTI): Primary | ICD-10-CM

## 2025-08-20 DIAGNOSIS — C34.12 ADENOCARCINOMA OF UPPER LOBE OF LEFT LUNG (MULTI): ICD-10-CM

## 2025-08-20 PROCEDURE — 99213 OFFICE O/P EST LOW 20 MIN: CPT | Performed by: STUDENT IN AN ORGANIZED HEALTH CARE EDUCATION/TRAINING PROGRAM

## 2025-08-20 PROCEDURE — G2211 COMPLEX E/M VISIT ADD ON: HCPCS | Performed by: STUDENT IN AN ORGANIZED HEALTH CARE EDUCATION/TRAINING PROGRAM

## 2025-08-20 ASSESSMENT — ENCOUNTER SYMPTOMS
COUGH: 1
PSYCHIATRIC NEGATIVE: 1
CARDIOVASCULAR NEGATIVE: 1
ENDOCRINE NEGATIVE: 1
HEMATOLOGIC/LYMPHATIC NEGATIVE: 1
NEUROLOGICAL NEGATIVE: 1
CONSTITUTIONAL NEGATIVE: 1
EYES NEGATIVE: 1
SHORTNESS OF BREATH: 1
ARTHRALGIAS: 1
GASTROINTESTINAL NEGATIVE: 1

## 2025-08-20 ASSESSMENT — PAIN SCALES - GENERAL: PAINLEVEL_OUTOF10: 0-NO PAIN

## 2025-10-24 ENCOUNTER — APPOINTMENT (OUTPATIENT)
Dept: PRIMARY CARE | Facility: CLINIC | Age: OVER 89
End: 2025-10-24
Payer: MEDICARE